# Patient Record
Sex: MALE | Race: WHITE
[De-identification: names, ages, dates, MRNs, and addresses within clinical notes are randomized per-mention and may not be internally consistent; named-entity substitution may affect disease eponyms.]

---

## 2018-01-09 ENCOUNTER — HOSPITAL ENCOUNTER (EMERGENCY)
Dept: HOSPITAL 72 - EMR | Age: 28
Discharge: HOME | End: 2018-01-09
Payer: COMMERCIAL

## 2018-01-09 VITALS — HEIGHT: 72 IN | BODY MASS INDEX: 23.03 KG/M2 | WEIGHT: 170 LBS

## 2018-01-09 VITALS — DIASTOLIC BLOOD PRESSURE: 74 MMHG | SYSTOLIC BLOOD PRESSURE: 125 MMHG

## 2018-01-09 DIAGNOSIS — J11.1: Primary | ICD-10-CM

## 2018-01-09 PROCEDURE — 99284 EMERGENCY DEPT VISIT MOD MDM: CPT

## 2018-01-09 PROCEDURE — 86710 INFLUENZA VIRUS ANTIBODY: CPT

## 2018-01-09 PROCEDURE — 96372 THER/PROPH/DIAG INJ SC/IM: CPT

## 2018-01-09 NOTE — EMERGENCY ROOM REPORT
History of Present Illness


General


Chief Complaint:  Headache


Source:  Patient





Present Illness


[facility]


The patient is a 27-year-old male who denies medical history presenting for 3 

days of nausea, vomiting, headache, fever, chills, myalgia, cough, and fatigue.

  He denies any known sick contacts or recent travel.  Pain is a 9/10 sharp 

sensation primarily to his head.  No known provoking relieving factors.


He denies other symptoms including hematemesis, diarrhea, neck pain or stiffness

, rash


Allergies:  


Coded Allergies:  


     No Known Allergies (Unverified , 1/9/18)





Patient History


Past Medical History:  see triage record


Pertinent Family History:  none


Reviewed Nursing Documentation:  PMH: Agreed, PSxH: Agreed





Nursing Documentation-PMH


Past Medical History:  No Stated History





Review of Systems


All Other Systems:  negative except mentioned in HPI





Physical Exam





Vital Signs








  Date Time  Temp Pulse Resp B/P (MAP) Pulse Ox O2 Delivery O2 Flow Rate FiO2


 


1/9/18 11:39 101.1 116 21 122/62 95 Room Air  








Sp02 EP Interpretation:  reviewed, normal


General Appearance:  no apparent distress, alert, GCS 15, non-toxic, lethargic


Head:  normocephalic, atraumatic


Eyes:  bilateral eye normal inspection, bilateral eye PERRL


ENT:  no angioedema, uvula midline, pharyngeal erythema


Neck:  full range of motion, supple/symm/no masses


Respiratory:  chest non-tender, lungs clear, normal breath sounds, no wheezing, 

speaking full sentences


Cardiovascular #1:  regular rate, rhythm, no edema


Musculoskeletal:  back normal, gait/station normal, normal range of motion, non-

tender


Neurologic:  alert, oriented x3, responsive, motor strength/tone normal, 

sensory intact, speech normal


Psychiatric:  judgement/insight normal, memory normal, mood/affect normal, no 

suicidal/homicidal ideation


Skin:  normal color, no rash, warm/dry, well hydrated


Lymphatic:  no adenopathy





Medical Decision Making


PA Attestation


Dr. Lara is my supervising physician. Patient management was discussed with 

my supervising physician


Diagnostic Impression:  


 Primary Impression:  


 Influenza


ER Course


The patient is a 27-year-old male who denies medical history presenting for 3 

days of nausea, vomiting, headache, fever, chills, myalgia, cough, and fatigue





Differential diagnosis include but not limited to influenza, pharyngitis, 

sinusitis, AOM, bronchitis, PNA





Physical exam: Patient is febrile.  Lethargic


HEENT exam reveals pharyngeal erythema.  No exudate.  Nasal congestion


Lungs are clear to auscultation bilaterally.  No respiratory distress


Skin warm and dry





The patient is given Toradol, reglan, and benadryl and is feeling better. 





The patient will be treated with motrin, zofran, cough medication.  he is given 

strict ER precautions.





Microbiology








 Date/Time


Source Procedure


Growth Status


 


 


 1/9/18 12:36


Nasal Nares Influenza Types A,B Antigen (POPPY) - Final Complete








Lab Results Impression


negative





Last Vital Signs








  Date Time  Temp Pulse Resp B/P (MAP) Pulse Ox O2 Delivery O2 Flow Rate FiO2


 


1/9/18 11:39 101.1 116 21 122/62 95 Room Air  








Status:  improved


Disposition:  HOME, SELF-CARE


Condition:  Improved


Scripts


Codeine/Promethazine Hcl* (PROMETHAZINE-CODEINE SYRUP*) 118 Ml Syrup


5 ML ORAL Q6H Y for For Cough, #118 ML 0 Refills


   Prov: ANTHONY KIMBLE P.A.         1/9/18 


Ondansetron* (ZOFRAN*) 4 Mg Tablet


4 MG ORAL Q6H Y for Nausea & Vomiting, #15 TAB


   Prov: TERZIANANTHONY P.A.         1/9/18 


Ibuprofen* (MOTRIN*) 600 Mg Tablet


600 MG ORAL Q8H Y for For Pain, #30 TAB 0 Refills


   Prov: TERZIANANTHONY P.A.         1/9/18


Referrals:  


NOT CHOSEN IPA/MD,REFERRING (PCP)











ANTHONY KIMBLE Jan 9, 2018 12:34

## 2023-10-20 ENCOUNTER — HOSPITAL ENCOUNTER (INPATIENT)
Facility: CLINIC | Age: 33
LOS: 6 days | Discharge: PSYCHIATRIC OR SUBSTANCE USE FACILITY | DRG: 897 | End: 2023-10-27
Attending: FAMILY MEDICINE | Admitting: PSYCHIATRY & NEUROLOGY

## 2023-10-20 DIAGNOSIS — F13.20 BENZODIAZEPINE DEPENDENCE (H): ICD-10-CM

## 2023-10-20 DIAGNOSIS — F15.10 METHAMPHETAMINE ABUSE (H): ICD-10-CM

## 2023-10-20 DIAGNOSIS — F10.10 ALCOHOL ABUSE: ICD-10-CM

## 2023-10-20 DIAGNOSIS — F11.229 OPIOID DEPENDENCE WITH INTOXICATION WITH COMPLICATION (H): ICD-10-CM

## 2023-10-20 LAB
ALBUMIN SERPL BCG-MCNC: 4.5 G/DL (ref 3.5–5.2)
ALP SERPL-CCNC: 72 U/L (ref 40–129)
ALT SERPL W P-5'-P-CCNC: 38 U/L (ref 0–70)
ANION GAP SERPL CALCULATED.3IONS-SCNC: 9 MMOL/L (ref 7–15)
AST SERPL W P-5'-P-CCNC: 42 U/L (ref 0–45)
BASO+EOS+MONOS # BLD AUTO: ABNORMAL 10*3/UL
BASO+EOS+MONOS NFR BLD AUTO: ABNORMAL %
BASOPHILS # BLD AUTO: 0 10E3/UL (ref 0–0.2)
BASOPHILS NFR BLD AUTO: 1 %
BILIRUB SERPL-MCNC: 0.2 MG/DL
BUN SERPL-MCNC: 22.4 MG/DL (ref 6–20)
CALCIUM SERPL-MCNC: 8.9 MG/DL (ref 8.6–10)
CHLORIDE SERPL-SCNC: 100 MMOL/L (ref 98–107)
CREAT SERPL-MCNC: 0.89 MG/DL (ref 0.67–1.17)
DEPRECATED HCO3 PLAS-SCNC: 27 MMOL/L (ref 22–29)
EGFRCR SERPLBLD CKD-EPI 2021: >90 ML/MIN/1.73M2
EOSINOPHIL # BLD AUTO: 0.2 10E3/UL (ref 0–0.7)
EOSINOPHIL NFR BLD AUTO: 3 %
ERYTHROCYTE [DISTWIDTH] IN BLOOD BY AUTOMATED COUNT: 11.9 % (ref 10–15)
ETHANOL SERPL-MCNC: <0.01 G/DL
GLUCOSE SERPL-MCNC: 124 MG/DL (ref 70–99)
HCT VFR BLD AUTO: 37.1 % (ref 40–53)
HGB BLD-MCNC: 12.4 G/DL (ref 13.3–17.7)
IMM GRANULOCYTES # BLD: 0 10E3/UL
IMM GRANULOCYTES NFR BLD: 0 %
LYMPHOCYTES # BLD AUTO: 2.9 10E3/UL (ref 0.8–5.3)
LYMPHOCYTES NFR BLD AUTO: 55 %
MCH RBC QN AUTO: 32 PG (ref 26.5–33)
MCHC RBC AUTO-ENTMCNC: 33.4 G/DL (ref 31.5–36.5)
MCV RBC AUTO: 96 FL (ref 78–100)
MONOCYTES # BLD AUTO: 0.6 10E3/UL (ref 0–1.3)
MONOCYTES NFR BLD AUTO: 12 %
NEUTROPHILS # BLD AUTO: 1.5 10E3/UL (ref 1.6–8.3)
NEUTROPHILS NFR BLD AUTO: 29 %
NRBC # BLD AUTO: 0 10E3/UL
NRBC BLD AUTO-RTO: 0 /100
PLATELET # BLD AUTO: 230 10E3/UL (ref 150–450)
POTASSIUM SERPL-SCNC: 4 MMOL/L (ref 3.4–5.3)
PROT SERPL-MCNC: 6.4 G/DL (ref 6.4–8.3)
RBC # BLD AUTO: 3.87 10E6/UL (ref 4.4–5.9)
SODIUM SERPL-SCNC: 136 MMOL/L (ref 135–145)
WBC # BLD AUTO: 5.3 10E3/UL (ref 4–11)

## 2023-10-20 PROCEDURE — 99284 EMERGENCY DEPT VISIT MOD MDM: CPT | Performed by: FAMILY MEDICINE

## 2023-10-20 PROCEDURE — 82077 ASSAY SPEC XCP UR&BREATH IA: CPT | Performed by: FAMILY MEDICINE

## 2023-10-20 PROCEDURE — 80053 COMPREHEN METABOLIC PANEL: CPT | Performed by: FAMILY MEDICINE

## 2023-10-20 PROCEDURE — 85025 COMPLETE CBC W/AUTO DIFF WBC: CPT | Performed by: FAMILY MEDICINE

## 2023-10-20 PROCEDURE — 36415 COLL VENOUS BLD VENIPUNCTURE: CPT | Performed by: FAMILY MEDICINE

## 2023-10-20 PROCEDURE — 99285 EMERGENCY DEPT VISIT HI MDM: CPT | Mod: 25 | Performed by: FAMILY MEDICINE

## 2023-10-20 PROCEDURE — 82977 ASSAY OF GGT: CPT | Performed by: PSYCHIATRY & NEUROLOGY

## 2023-10-20 PROCEDURE — 83036 HEMOGLOBIN GLYCOSYLATED A1C: CPT | Performed by: PSYCHIATRY & NEUROLOGY

## 2023-10-20 PROCEDURE — HZ2ZZZZ DETOXIFICATION SERVICES FOR SUBSTANCE ABUSE TREATMENT: ICD-10-PCS | Performed by: CLINICAL NURSE SPECIALIST

## 2023-10-20 PROCEDURE — 84443 ASSAY THYROID STIM HORMONE: CPT | Performed by: PSYCHIATRY & NEUROLOGY

## 2023-10-20 ASSESSMENT — ACTIVITIES OF DAILY LIVING (ADL): ADLS_ACUITY_SCORE: 35

## 2023-10-21 ENCOUNTER — TELEPHONE (OUTPATIENT)
Dept: BEHAVIORAL HEALTH | Facility: CLINIC | Age: 33
End: 2023-10-21

## 2023-10-21 PROBLEM — F13.20 BENZODIAZEPINE DEPENDENCE (H): Status: ACTIVE | Noted: 2023-10-21

## 2023-10-21 LAB
AMPHETAMINES UR QL SCN: ABNORMAL
BARBITURATES UR QL SCN: ABNORMAL
BENZODIAZ UR QL SCN: ABNORMAL
BZE UR QL SCN: ABNORMAL
CANNABINOIDS UR QL SCN: ABNORMAL
FENTANYL UR QL: ABNORMAL
OPIATES UR QL SCN: ABNORMAL
PCP QUAL URINE (ROCHE): ABNORMAL

## 2023-10-21 PROCEDURE — 99223 1ST HOSP IP/OBS HIGH 75: CPT | Mod: AI | Performed by: CLINICAL NURSE SPECIALIST

## 2023-10-21 PROCEDURE — 128N000004 HC R&B CD ADULT

## 2023-10-21 PROCEDURE — 250N000013 HC RX MED GY IP 250 OP 250 PS 637: Performed by: CLINICAL NURSE SPECIALIST

## 2023-10-21 PROCEDURE — 80307 DRUG TEST PRSMV CHEM ANLYZR: CPT | Performed by: FAMILY MEDICINE

## 2023-10-21 RX ORDER — BUPRENORPHINE AND NALOXONE 8; 2 MG/1; MG/1
1 FILM, SOLUBLE BUCCAL; SUBLINGUAL DAILY
Status: ON HOLD | COMMUNITY
End: 2023-10-27

## 2023-10-21 RX ORDER — ACETAMINOPHEN 325 MG/1
650 TABLET ORAL EVERY 4 HOURS PRN
Status: DISCONTINUED | OUTPATIENT
Start: 2023-10-21 | End: 2023-10-27 | Stop reason: HOSPADM

## 2023-10-21 RX ORDER — LOPERAMIDE HCL 2 MG
2 CAPSULE ORAL 4 TIMES DAILY PRN
Status: DISCONTINUED | OUTPATIENT
Start: 2023-10-21 | End: 2023-10-27 | Stop reason: HOSPADM

## 2023-10-21 RX ORDER — MAGNESIUM HYDROXIDE/ALUMINUM HYDROXICE/SIMETHICONE 120; 1200; 1200 MG/30ML; MG/30ML; MG/30ML
30 SUSPENSION ORAL EVERY 4 HOURS PRN
Status: DISCONTINUED | OUTPATIENT
Start: 2023-10-21 | End: 2023-10-27 | Stop reason: HOSPADM

## 2023-10-21 RX ORDER — AMOXICILLIN 250 MG
1 CAPSULE ORAL 2 TIMES DAILY PRN
Status: DISCONTINUED | OUTPATIENT
Start: 2023-10-21 | End: 2023-10-27 | Stop reason: HOSPADM

## 2023-10-21 RX ORDER — HYDROXYZINE HYDROCHLORIDE 25 MG/1
25 TABLET, FILM COATED ORAL EVERY 4 HOURS PRN
Status: DISCONTINUED | OUTPATIENT
Start: 2023-10-21 | End: 2023-10-27 | Stop reason: HOSPADM

## 2023-10-21 RX ORDER — CLONIDINE HYDROCHLORIDE 0.1 MG/1
0.1 TABLET ORAL 4 TIMES DAILY PRN
Status: DISCONTINUED | OUTPATIENT
Start: 2023-10-21 | End: 2023-10-27 | Stop reason: HOSPADM

## 2023-10-21 RX ORDER — NICOTINE 21 MG/24HR
1 PATCH, TRANSDERMAL 24 HOURS TRANSDERMAL DAILY
Status: DISCONTINUED | OUTPATIENT
Start: 2023-10-21 | End: 2023-10-27 | Stop reason: HOSPADM

## 2023-10-21 RX ORDER — BUPRENORPHINE HYDROCHLORIDE AND NALOXONE HYDROCHLORIDE DIHYDRATE 8; 2 MG/1; MG/1
1 TABLET SUBLINGUAL DAILY PRN
Status: DISCONTINUED | OUTPATIENT
Start: 2023-10-21 | End: 2023-10-27 | Stop reason: HOSPADM

## 2023-10-21 RX ORDER — ONDANSETRON 4 MG/1
4 TABLET, ORALLY DISINTEGRATING ORAL EVERY 6 HOURS PRN
Status: DISCONTINUED | OUTPATIENT
Start: 2023-10-21 | End: 2023-10-27 | Stop reason: HOSPADM

## 2023-10-21 RX ORDER — PHENOBARBITAL 32.4 MG/1
32.4 TABLET ORAL 3 TIMES DAILY
Status: DISCONTINUED | OUTPATIENT
Start: 2023-10-21 | End: 2023-10-24

## 2023-10-21 RX ORDER — TRAZODONE HYDROCHLORIDE 50 MG/1
50 TABLET, FILM COATED ORAL
Status: DISCONTINUED | OUTPATIENT
Start: 2023-10-21 | End: 2023-10-27 | Stop reason: HOSPADM

## 2023-10-21 RX ORDER — FLUOXETINE 40 MG/1
40 CAPSULE ORAL DAILY
Status: ON HOLD | COMMUNITY
End: 2023-10-27

## 2023-10-21 RX ADMIN — PHENOBARBITAL 32.4 MG: 32.4 TABLET ORAL at 12:37

## 2023-10-21 RX ADMIN — BUPRENORPHINE AND NALOXONE 1 TABLET: 8; 2 TABLET SUBLINGUAL at 22:07

## 2023-10-21 RX ADMIN — TRAZODONE HYDROCHLORIDE 50 MG: 50 TABLET ORAL at 23:22

## 2023-10-21 RX ADMIN — NICOTINE POLACRILEX 4 MG: 4 GUM, CHEWING BUCCAL at 12:35

## 2023-10-21 RX ADMIN — NICOTINE 1 PATCH: 14 PATCH, EXTENDED RELEASE TRANSDERMAL at 12:33

## 2023-10-21 RX ADMIN — PHENOBARBITAL 32.4 MG: 32.4 TABLET ORAL at 19:18

## 2023-10-21 RX ADMIN — ACETAMINOPHEN 650 MG: 325 TABLET, FILM COATED ORAL at 20:15

## 2023-10-21 RX ADMIN — FLUOXETINE 40 MG: 20 CAPSULE ORAL at 12:37

## 2023-10-21 RX ADMIN — HYDROXYZINE HYDROCHLORIDE 25 MG: 25 TABLET, FILM COATED ORAL at 22:59

## 2023-10-21 RX ADMIN — HYDROXYZINE HYDROCHLORIDE 25 MG: 25 TABLET, FILM COATED ORAL at 19:18

## 2023-10-21 ASSESSMENT — ACTIVITIES OF DAILY LIVING (ADL)
ADLS_ACUITY_SCORE: 35
HYGIENE/GROOMING: INDEPENDENT
ADLS_ACUITY_SCORE: 35
ADLS_ACUITY_SCORE: 35
ADLS_ACUITY_SCORE: 28
ADLS_ACUITY_SCORE: 35
DRESS: STREET CLOTHES
ADLS_ACUITY_SCORE: 28
HYGIENE/GROOMING: INDEPENDENT
ADLS_ACUITY_SCORE: 28
ORAL_HYGIENE: INDEPENDENT
ADLS_ACUITY_SCORE: 35
ADLS_ACUITY_SCORE: 28
ADLS_ACUITY_SCORE: 45
ORAL_HYGIENE: INDEPENDENT
DRESS: INDEPENDENT

## 2023-10-21 NOTE — ED NOTES
Patient reports multi drug use:  Benzos 4mg daily for about 3 months  Meth: daily, 0.5g for about 6 months  Fentanyl: 0.5 daily for about 3 months    Denies alcohol/denies SI.

## 2023-10-21 NOTE — MEDICATION SCRIBE - ADMISSION MEDICATION HISTORY
Medication Scribe Admission Medication History    Admission medication history is complete. The information provided in this note is only as accurate as the sources available at the time of the update.    Information Source(s): Patient and CareEverywhere/SureScripts via in-person  Patient reports he recently moved from [place], [place].    Pertinent Information: None.    Changes made to PTA medication list:  Added: Buprenorphine HCL- Naloxone HCL 8-2 mg / Film, Fluoxetine 40 mg.  Deleted: None  Changed: None    Medication Affordability:  Not including over the counter (OTC) medications, was there a time in the past 3 months when you did not take your medications as prescribed because of cost?: Yes    Allergies reviewed with patient and updates made in EHR: yes    Medication History Completed By: Claude Whittington MD 10/21/2023 8:52 AM    PTA Med List   Medication Sig Last Dose    buprenorphine HCl-naloxone HCl (SUBOXONE) 8-2 MG per film Place 1 Film under the tongue daily Past Week at unknown    FLUoxetine (PROZAC) 40 MG capsule Take 40 mg by mouth daily Past Week at unknown

## 2023-10-21 NOTE — TELEPHONE ENCOUNTER
00:40 - Left message for Array provider. Awaiting CB  01:44 - Dr. Figueroa accepts for detox. Placed pt in queue for 3A    R: SHIRA / Ilir / TONIE    01:48 - Notified unit RN  01:49 - Notified ED and requested a UDS be collected prior to transfer     Indicia completed

## 2023-10-21 NOTE — PROGRESS NOTES
10/21/23 1211   Patient Belongings   Did you bring any home meds/supplements to the hospital?  No   Patient Belongings other (see comments)   Belongings Search Yes   Clothing Search Yes   Second Staff Fred Campbell     Storage: back pack, shirts, hat light jacket.   Shoes, hat, 2 small bags, water bottle in storage  Wallet, 2 cell in med room  Mirror ,book light,ear buds, 2 lighters , 3 tools in sharps  3 goldtone necklaces, passport, $11.00 cash, Visa, 2 MC, [place] DL in security  A               Admission:  I am responsible for any personal items that are not sent to the safe or pharmacy.  [facility] is not responsible for loss, theft or damage of any property in my possession.    Signature:  _________________________________ Date: _______  Time: _____                                              Staff Signature:  ____________________________ Date: ________  Time: _____      2nd Staff person, if patient is unable/unwilling to sign:    Signature: ________________________________ Date: ________  Time: _____     Discharge:  [facility] has returned all of my personal belongings:    Signature: _________________________________ Date: ________  Time: _____                                          Staff Signature:  ____________________________ Date: ________  Time: _____
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
## 2023-10-21 NOTE — ED PROVIDER NOTES
ED Provider Note  [facility]      History     Chief Complaint   Patient presents with    Addiction Problem     Meth, fentanyl, benzo and xanax.  Patient states he needs help getting off these drugs.  Patient states meth is the one he uses daily.      HPI  Max H Bosworth is a 33 year old male who presents seeking detox from multiple substances.  Patient states he has a longstanding addiction to opiates, primarily fentanyl.  Has been on and off Suboxone.  For the last 2 months he has been using fentanyl multiple times a week and last used approximately 2 hours ago.  He generally experiences opiate withdrawal within 24 hours of stopping.  He also has been using benzodiazepines on a daily basis for over 2 months, he estimates 4 to 6 mg of Xanax daily.  His last use was approximately 18 hours ago.  He uses methamphetamine several times a week, last used 24 hours ago.  He drinks alcohol approximately 6 beers per day for the last 2 months last drink earlier this morning.  He states he has never had alcohol withdrawal seizures, benzo with drawl seizures, or DTs but experiences sweats, shakes, nausea, restlessness and has been unable to stop using any of these substances in the community without assistance.  He is hoping to get detoxed off all substances and enter chemical dependency treatment.  He denies other medical or psychiatric complaints.    Past Medical History  No past medical history on file.  No past surgical history on file.  No current outpatient medications on file.    Allergies   Allergen Reactions    Penicillins Hives    Wellbutrin [Bupropion] Hives     Family History  No family history on file.  Social History          A medically appropriate review of systems was performed with pertinent positives and negatives noted in the HPI, and all other systems negative.    Physical Exam   BP: 109/66  Pulse: 58  Temp: 97.9  F (36.6  C)  Resp: 16  Weight: 74.8 kg (165 lb)  SpO2: 97  %  Physical Exam  Vitals and nursing note reviewed.   Constitutional:       General: He is not in acute distress.     Appearance: Normal appearance. He is not toxic-appearing.   HENT:      Head: Atraumatic.   Eyes:      General: No scleral icterus.     Conjunctiva/sclera: Conjunctivae normal.   Cardiovascular:      Rate and Rhythm: Normal rate.      Heart sounds: Normal heart sounds.   Pulmonary:      Effort: Pulmonary effort is normal. No respiratory distress.      Breath sounds: Normal breath sounds.   Abdominal:      Palpations: Abdomen is soft.      Tenderness: There is no abdominal tenderness.   Musculoskeletal:         General: No deformity.      Cervical back: Neck supple.   Skin:     General: Skin is warm.   Neurological:      Mental Status: He is alert.   Psychiatric:         Attention and Perception: Attention normal.         Mood and Affect: Mood normal.         Speech: Speech normal.         Behavior: Behavior normal.           ED Course, Procedures, & Data      Procedures                     Results for orders placed or performed during the hospital encounter of 10/20/23   Comprehensive metabolic panel     Status: Abnormal   Result Value Ref Range    Sodium 136 135 - 145 mmol/L    Potassium 4.0 3.4 - 5.3 mmol/L    Carbon Dioxide (CO2) 27 22 - 29 mmol/L    Anion Gap 9 7 - 15 mmol/L    Urea Nitrogen 22.4 (H) 6.0 - 20.0 mg/dL    Creatinine 0.89 0.67 - 1.17 mg/dL    GFR Estimate >90 >60 mL/min/1.73m2    Calcium 8.9 8.6 - 10.0 mg/dL    Chloride 100 98 - 107 mmol/L    Glucose 124 (H) 70 - 99 mg/dL    Alkaline Phosphatase 72 40 - 129 U/L    AST 42 0 - 45 U/L    ALT 38 0 - 70 U/L    Protein Total 6.4 6.4 - 8.3 g/dL    Albumin 4.5 3.5 - 5.2 g/dL    Bilirubin Total 0.2 <=1.2 mg/dL   Ethyl Alcohol Level     Status: Normal   Result Value Ref Range    Alcohol ethyl <0.01 <=0.01 g/dL   CBC with platelets and differential     Status: Abnormal   Result Value Ref Range    WBC Count 5.3 4.0 - 11.0 10e3/uL    RBC Count  3.87 (L) 4.40 - 5.90 10e6/uL    Hemoglobin 12.4 (L) 13.3 - 17.7 g/dL    Hematocrit 37.1 (L) 40.0 - 53.0 %    MCV 96 78 - 100 fL    MCH 32.0 26.5 - 33.0 pg    MCHC 33.4 31.5 - 36.5 g/dL    RDW 11.9 10.0 - 15.0 %    Platelet Count 230 150 - 450 10e3/uL    % Neutrophils 29 %    % Lymphocytes 55 %    % Monocytes 12 %    Mids % (Monos, Eos, Basos)      % Eosinophils 3 %    % Basophils 1 %    % Immature Granulocytes 0 %    NRBCs per 100 WBC 0 <1 /100    Absolute Neutrophils 1.5 (L) 1.6 - 8.3 10e3/uL    Absolute Lymphocytes 2.9 0.8 - 5.3 10e3/uL    Absolute Monocytes 0.6 0.0 - 1.3 10e3/uL    Mids Abs (Monos, Eos, Basos)      Absolute Eosinophils 0.2 0.0 - 0.7 10e3/uL    Absolute Basophils 0.0 0.0 - 0.2 10e3/uL    Absolute Immature Granulocytes 0.0 <=0.4 10e3/uL    Absolute NRBCs 0.0 10e3/uL   CBC with platelets differential     Status: Abnormal    Narrative    The following orders were created for panel order CBC with platelets differential.  Procedure                               Abnormality         Status                     ---------                               -----------         ------                     CBC with platelets and d...[[id number]]  Abnormal            Final result                 Please view results for these tests on the individual orders.     Medications - No data to display  Labs Ordered and Resulted from Time of ED Arrival to Time of ED Departure   COMPREHENSIVE METABOLIC PANEL - Abnormal       Result Value    Sodium 136      Potassium 4.0      Carbon Dioxide (CO2) 27      Anion Gap 9      Urea Nitrogen 22.4 (*)     Creatinine 0.89      GFR Estimate >90      Calcium 8.9      Chloride 100      Glucose 124 (*)     Alkaline Phosphatase 72      AST 42      ALT 38      Protein Total 6.4      Albumin 4.5      Bilirubin Total 0.2     CBC WITH PLATELETS AND DIFFERENTIAL - Abnormal    WBC Count 5.3      RBC Count 3.87 (*)     Hemoglobin 12.4 (*)     Hematocrit 37.1 (*)     MCV 96      MCH 32.0      MCHC  33.4      RDW 11.9      Platelet Count 230      % Neutrophils 29      % Lymphocytes 55      % Monocytes 12      Mids % (Monos, Eos, Basos)        % Eosinophils 3      % Basophils 1      % Immature Granulocytes 0      NRBCs per 100 WBC 0      Absolute Neutrophils 1.5 (*)     Absolute Lymphocytes 2.9      Absolute Monocytes 0.6      Mids Abs (Monos, Eos, Basos)        Absolute Eosinophils 0.2      Absolute Basophils 0.0      Absolute Immature Granulocytes 0.0      Absolute NRBCs 0.0     ETHYL ALCOHOL LEVEL - Normal    Alcohol ethyl <0.01       No orders to display          Critical care was not performed.     Medical Decision Making  The patient's presentation was of high complexity (a chronic illness severe exacerbation, progression, or side effect of treatment).    The patient's evaluation involved:  an assessment requiring an independent historian (significant other provides collateral information)  ordering and/or review of 3+ test(s) in this encounter (see separate area of note for details)    The patient's management necessitated moderate risk (prescription drug management including medications given in the ED) and high risk (a decision regarding hospitalization).    Assessment & Plan    A 33-year-old male with a history of polysubstance abuse who is known to be dependent on benzodiazepines, opiates and possibly alcohol and also uses methamphetamine.  Has been using all 4 substances for the last 2 months and all within the last 24 hours.  Pertinent to our detox facility, uses up to 6 mg of Xanax per day and has experienced benzodiazepine withdrawal in the past though he does not have a history of withdrawal seizures or DTs.  Currently his alcohol level is 0 and he appears intoxicated possibly with opiates.  Labs were obtained and they show no contraindication to detox admission.  He is an appropriate candidate for voluntary detox admission as he may undergo complicated withdrawal from benzodiazepines and/or  alcohol.  He will consent to admission.    I have reviewed the nursing notes. I have reviewed the findings, diagnosis, plan and need for follow up with the patient.    New Prescriptions    No medications on file       Final diagnoses:   Benzodiazepine dependence (H)   Opioid dependence with intoxication with complication (H)   Alcohol abuse   Methamphetamine abuse (H)       Krishna Jacobs MD  [facility] EMERGENCY DEPARTMENT  10/20/2023     Krishna Jacobs MD  10/21/23 0026

## 2023-10-21 NOTE — PLAN OF CARE
Problem: Substance Withdrawal  Intervention: Substance Withdrawal  Recent Flowsheet Documentation  Taken 10/21/2023 1210 by Isak Ramírez RN  Substance Withdrawal Interventions:   interventions implemented as appropriate   monitor substance withdrawal process   encourage nutrition and hydration  Alcohol Withdrawl Interventions:   monitor need for prn medication   provide emotional support   Goal Outcome Evaluation:    Plan of Care Reviewed With: patient        SBAR Max H Bosworth is a 33 year old year old male with a chief complaint of Addiction Problem (Meth, fentanyl, benzo and xanax.  Patient states he needs help getting off these drugs.  Patient states meth is the one he uses daily. )    S = Situation:   Admit  B  = Background:   Pt admitted for benzodiazepine withdrawal.  Pt reports drinking occasionally, but he uses fentanyl 0.5 mg daily and methamphetamines daily.  Pt reports that his use of xanax is approximately 4-6 mg a day but he has no idea of its real strength or it's authenticity.  Pt says that the xanax isn't hospital grade or from a prescription--it is from another source.  At this time, pt is having some withdrawal but his withdrawal is from multiple drugs and the symptoms are muddied in regard to specific drug withdrawal.  Pt arrived in [place] from [place] yesterday morning on the recommendation of a friend.  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  He is familiar with the detox process.  He has been through treatment X 5.  Discussed out of state insurance coverage for treatment and detox.    A  =  Assessment:   Vital Signs: /77   Pulse 65   Temp 97.8  F (36.6  C) (Oral)   Resp 16   Ht 1.829 m (6')   Wt 74.8 kg (165 lb)   SpO2 97%   BMI 22.38 kg/m    Alert and oriented X 3, no SI, no SIB.  Pt reports chronic depression and anxiety.  He says that he medicates his anxiety with alcohol.  He has been having a difficulty time achieving any length of  sobriety while living in [place].  Pt has a diaphoretic glow, a low grade tremor, he is anxious and endorses depression.  Pt is cooperative and calm.    R =   Request or Recommendation:   Monitor multiple withdrawal syndromes, Dr. Roque to evaluate, case management to see, medicine to see

## 2023-10-21 NOTE — PROGRESS NOTES
Case Management:     Writer checked-in and introduced role to pt's care and how to assist pt during their stay. Inquired with pt about what they are needing during their stay and what they are considering for their aftercare plans. Pt processed that he is from and lives in [place], [place]. Pt discussed that he came out to [place] to try treatment out here noting that he has done various treatments in [place] feeing that they weren't helpful. Pt processed that his brother's are working on/solidifying his insurance. Pt shared that he has been using Fentanyl, Meth and Benzos. Discussed with pt about completing his assessment paper work noting that he will need that to help with referrals to treatment. Pt was agreeable to complete the assessment paper work      Referral Status:  None at this time but pt is seeking inpatient treatment:  Considerations: [facility], [facility], [facility]    Contacts:  None at this time    Next Steps and Discharge Plan or Goal:  Pt needs an AMANDO asssessment and a CM/ will complete one once pt turns in his paper work. CM to check in with pt tomorrow.     Lelo Coley, LADC, LPCC

## 2023-10-21 NOTE — PLAN OF CARE
Problem: Substance Withdrawal  Goal: Substance Withdrawal  Description: Signs and symptoms of listed problems will be absent or manageable.  Outcome: Progressing   Goal Outcome Evaluation:    Patient is 33 year old male who was admitted on the day shift  for benzodiazepine withdrawal.     On this shift patient was alert and oriented. Vitals were stable MSSA score  was 3 and 5, COWS were 4 and 2.  Affect bright , mood was calmed and cooperative. Patient  denied SI/SIB or feeling depressed, verbalized feeling a little anxious, but should get better soon, patient was given PRN hydroxyzine at this time.    Patient complained of back pain rated at 7/10 was given PRN Tylenol. Patient spent most of the shift sitting in the lounge watching TV with peers. Took all scheduled medications with zero problem. Appetite was good ate 100% dinner and snacks. No behavior issues or any safety concern observed on this shift.    Around 2200 patient complained of feeling anxious and sweating re assessed patient at this time,  MSSA was 10, COWS 11. Patient was given Suboxone per COWS protocol. After 30 minutes patient came out again complaining of feeling anxious and restless. Paged [facility] awaiting for call back. Patient was given PRN hydroxyzine at 2300, patient just took a shower at 2300. Will continue to monitor. PRN trazodone given at 2325.

## 2023-10-21 NOTE — TELEPHONE ENCOUNTER
S: [facility] , Provider Reynaldo calling at 12:23AM with clinical on a 33 year old/Male presenting for alcohol/benzo detox.     B: Pt presents for ETOH detox.   Currently reports drinking 6 beers a day and using 4-6 mg of xanax a day.  Pt reports daily use of xanax for the past 2 months   Patient reports last use was this morning for alcohol and 18 hours ago for xanax.  Pt ADELINA: 0.0  Pt  denies hx of DT  Pt  denies hx of seizures. Last seizure: N/A  Pt endorsing the following symptoms of withdrawal: Shaky  MSSA Score: None    Pt denies acute mental health or medical concerns.   Pt endorses other drug use: Amphetamines Amount/frequency:  Pt reports using meth yesterday and fentanyl before coming into the ED.    Does Pt have a detox care plan in [facility]? No  Does pt present with specific needs, assistive devices, or exclusionary criteria? None  Is the patient ambulating, eating and drinking in the ED? Yes    A: Pt meets criteria to be presented for IP detox admission. Patient is voluntary    COVID Symptoms: No  If yes, COVID test required   Utox: Not ordered, intake to request lab   CMP: Abnormalities: Urea Nitrogen 22.4, Glucose 124  CBC: Abnormalities: RBC 3.87, Hemoglobin 12.4, Hematocrit 37.1, Absolute Neutrophils 1.5  HCG: N/A     R: Patient cleared and ready for behavioral bed placement: Yes    Pt is meeting criteria for presentation to 3A/CD    Does Patient need a Transfer Center request created? No, Pt is located within [facility] ED, [facility] ED, or [place] ED

## 2023-10-21 NOTE — ED NOTES
Pt had 4 pieces of tinfoil with drug paraphernalia in them that was found during security search. Drugs were taken from pt. and given to security.

## 2023-10-22 PROCEDURE — 128N000004 HC R&B CD ADULT

## 2023-10-22 PROCEDURE — 250N000013 HC RX MED GY IP 250 OP 250 PS 637: Performed by: CLINICAL NURSE SPECIALIST

## 2023-10-22 PROCEDURE — 250N000013 HC RX MED GY IP 250 OP 250 PS 637: Performed by: PSYCHIATRY & NEUROLOGY

## 2023-10-22 RX ORDER — BUPRENORPHINE AND NALOXONE 8; 2 MG/1; MG/1
1 FILM, SOLUBLE BUCCAL; SUBLINGUAL DAILY
Status: DISCONTINUED | OUTPATIENT
Start: 2023-10-22 | End: 2023-10-27 | Stop reason: HOSPADM

## 2023-10-22 RX ORDER — DICYCLOMINE HYDROCHLORIDE 10 MG/1
20 CAPSULE ORAL 4 TIMES DAILY PRN
Status: DISCONTINUED | OUTPATIENT
Start: 2023-10-22 | End: 2023-10-27 | Stop reason: HOSPADM

## 2023-10-22 RX ORDER — BUPRENORPHINE AND NALOXONE 8; 2 MG/1; MG/1
1 FILM, SOLUBLE BUCCAL; SUBLINGUAL ONCE
Status: COMPLETED | OUTPATIENT
Start: 2023-10-22 | End: 2023-10-22

## 2023-10-22 RX ORDER — METHOCARBAMOL 500 MG/1
500 TABLET, FILM COATED ORAL 4 TIMES DAILY PRN
Status: DISCONTINUED | OUTPATIENT
Start: 2023-10-22 | End: 2023-10-27 | Stop reason: HOSPADM

## 2023-10-22 RX ADMIN — HYDROXYZINE HYDROCHLORIDE 25 MG: 25 TABLET, FILM COATED ORAL at 22:13

## 2023-10-22 RX ADMIN — METHOCARBAMOL 500 MG: 500 TABLET ORAL at 08:07

## 2023-10-22 RX ADMIN — CLONIDINE HYDROCHLORIDE 0.1 MG: 0.1 TABLET ORAL at 00:20

## 2023-10-22 RX ADMIN — TRAZODONE HYDROCHLORIDE 50 MG: 50 TABLET ORAL at 22:13

## 2023-10-22 RX ADMIN — BUPRENORPHINE AND NALOXONE 1 FILM: 8; 2 FILM, SOLUBLE BUCCAL; SUBLINGUAL at 03:19

## 2023-10-22 RX ADMIN — HYDROXYZINE HYDROCHLORIDE 25 MG: 25 TABLET, FILM COATED ORAL at 08:07

## 2023-10-22 RX ADMIN — PHENOBARBITAL 32.4 MG: 32.4 TABLET ORAL at 13:06

## 2023-10-22 RX ADMIN — METHOCARBAMOL 500 MG: 500 TABLET ORAL at 22:13

## 2023-10-22 RX ADMIN — CLONIDINE HYDROCHLORIDE 0.1 MG: 0.1 TABLET ORAL at 16:35

## 2023-10-22 RX ADMIN — BUPRENORPHINE AND NALOXONE 1 TABLET: 8; 2 TABLET SUBLINGUAL at 16:35

## 2023-10-22 RX ADMIN — HYDROXYZINE HYDROCHLORIDE 25 MG: 25 TABLET, FILM COATED ORAL at 13:06

## 2023-10-22 RX ADMIN — PHENOBARBITAL 32.4 MG: 32.4 TABLET ORAL at 20:06

## 2023-10-22 RX ADMIN — FLUOXETINE 40 MG: 20 CAPSULE ORAL at 08:07

## 2023-10-22 RX ADMIN — METHOCARBAMOL 500 MG: 500 TABLET ORAL at 00:48

## 2023-10-22 RX ADMIN — PHENOBARBITAL 32.4 MG: 32.4 TABLET ORAL at 08:07

## 2023-10-22 RX ADMIN — BUPRENORPHINE AND NALOXONE 1 FILM: 8; 2 FILM, SOLUBLE BUCCAL; SUBLINGUAL at 08:49

## 2023-10-22 ASSESSMENT — ACTIVITIES OF DAILY LIVING (ADL)
ORAL_HYGIENE: INDEPENDENT
ADLS_ACUITY_SCORE: 28
DRESS: INDEPENDENT
ADLS_ACUITY_SCORE: 28
ADLS_ACUITY_SCORE: 28
ORAL_HYGIENE: INDEPENDENT
ADLS_ACUITY_SCORE: 28
HYGIENE/GROOMING: INDEPENDENT
ADLS_ACUITY_SCORE: 28
ADLS_ACUITY_SCORE: 28
HYGIENE/GROOMING: INDEPENDENT
DRESS: INDEPENDENT
ADLS_ACUITY_SCORE: 28

## 2023-10-22 NOTE — PLAN OF CARE
Pt continues to experience withdrawal symptoms - MSSA 10 and COWS 10. Pt given acetaminohen 650 mg @ 2015, Suboxone 8-2 Mg @ 2207, hydroxyzine 25 mg @ 2259, and clonidine 0.1 mg @ 0020, and Robaxin 500 mg @ 0048. COWS 14 - Provider notified. PRN dose of 8-2 mg of buprenorphine ordered. Spoke with pharmacy to verify - ok to give - pharmacy ok'd additional dose. Continue to monitor.

## 2023-10-22 NOTE — PROVIDER NOTIFICATION
Pt continues to experience withdrawal symptoms despite interventions- MSSA 10 and COWS 10; Pt given acetaminohen 650 mg @ 2015, Suboxone 8-2 Mg @ 2207, hydroxyzine 25 mg @ 2259, and clonidine 0.1 mg @ 0020.  Provider notified.

## 2023-10-22 NOTE — PLAN OF CARE
Problem: Substance Withdrawal  Goal: Substance Withdrawal  Description: Signs and symptoms of listed problems will be absent or manageable.  Outcome: Progressing  Flowsheets (Taken 10/21/2023 0053)  Substance Withdrawal Assessed: all    Behavioral  Pt appeared restless overnight; no behavioral concerns noted;     Medical  Pt continues in benzodiazepine and opiate withdrawal; (see prior notes). Pt given acetaminohen 650 mg @ 2015, Suboxone 8-2 Mg @ 2207, hydroxyzine 25 mg @ 2259, and clonidine 0.1 mg @ 0020, and Robaxin 500 mg @ 0048. COWS 14 - Provider notified. PRN dose of 8-2 mg of buprenorphine ordered. Spoke with pharmacy to verify - pharmacy ok'd additional dose.  After medication administration COWS 9 and 4;    Pt c/o diarrhea - pt declined offered PRN medications - accepted provided fluids

## 2023-10-22 NOTE — PLAN OF CARE
Problem: Adult Behavioral Health Plan of Care  Goal: Plan of Care Review  Outcome: Progressing  Flowsheets (Taken 10/22/2023 0800)  Patient Agreement with Plan of Care: agrees     Problem: Substance Withdrawal  Goal: Substance Withdrawal  Description: Signs and symptoms of listed problems will be absent or manageable.  Outcome: Progressing   Goal Outcome Evaluation:    Plan of Care Reviewed With: patient       Pt expressing significant discomfort this am as a result of early administration of suboxone.  Pt had rapid onset of opiate withdrawal symptoms after first dose of suboxone.  Suboxone dose was repeated 5 hours later and then was given again this am.  Pt is now feeling the symptoms have improved--his opiate and mssa scores have decreased.  Pt has been mostly staying in bed, getting up for vital signs and assessments.  He reports that he hasn't been able to sleep.  Pt instructed to stay awake today so that he can sleep at night.  Pt's affect is flat, mood is calm.  Pt is up ad kiara., with steady gait, his speech is clear.  Pt did not eat lunch but feels that he starting to get hungry and may possibly eat something for dinner.  Will continue to monitor withdrawal, assist with discharge planning and treatment options

## 2023-10-23 LAB
GGT SERPL-CCNC: 11 U/L (ref 8–61)
HBA1C MFR BLD: 5.3 %
TSH SERPL DL<=0.005 MIU/L-ACNC: 2.03 UIU/ML (ref 0.3–4.2)

## 2023-10-23 PROCEDURE — 128N000004 HC R&B CD ADULT

## 2023-10-23 PROCEDURE — H2032 ACTIVITY THERAPY, PER 15 MIN: HCPCS

## 2023-10-23 PROCEDURE — 87389 HIV-1 AG W/HIV-1&-2 AB AG IA: CPT | Performed by: PHYSICIAN ASSISTANT

## 2023-10-23 PROCEDURE — 99233 SBSQ HOSP IP/OBS HIGH 50: CPT | Performed by: PSYCHIATRY & NEUROLOGY

## 2023-10-23 PROCEDURE — 250N000013 HC RX MED GY IP 250 OP 250 PS 637: Performed by: PSYCHIATRY & NEUROLOGY

## 2023-10-23 PROCEDURE — 99222 1ST HOSP IP/OBS MODERATE 55: CPT | Performed by: PHYSICIAN ASSISTANT

## 2023-10-23 PROCEDURE — 36415 COLL VENOUS BLD VENIPUNCTURE: CPT | Performed by: PHYSICIAN ASSISTANT

## 2023-10-23 PROCEDURE — 87491 CHLMYD TRACH DNA AMP PROBE: CPT | Performed by: PHYSICIAN ASSISTANT

## 2023-10-23 PROCEDURE — 250N000013 HC RX MED GY IP 250 OP 250 PS 637: Performed by: CLINICAL NURSE SPECIALIST

## 2023-10-23 PROCEDURE — 250N000013 HC RX MED GY IP 250 OP 250 PS 637: Performed by: PHYSICIAN ASSISTANT

## 2023-10-23 PROCEDURE — 86803 HEPATITIS C AB TEST: CPT | Performed by: PHYSICIAN ASSISTANT

## 2023-10-23 RX ORDER — BUPRENORPHINE AND NALOXONE 4; 1 MG/1; MG/1
1 FILM, SOLUBLE BUCCAL; SUBLINGUAL
Status: DISCONTINUED | OUTPATIENT
Start: 2023-10-23 | End: 2023-10-24

## 2023-10-23 RX ORDER — SIMETHICONE 80 MG
80 TABLET,CHEWABLE ORAL EVERY 6 HOURS PRN
Status: DISCONTINUED | OUTPATIENT
Start: 2023-10-23 | End: 2023-10-27 | Stop reason: HOSPADM

## 2023-10-23 RX ADMIN — CLONIDINE HYDROCHLORIDE 0.1 MG: 0.1 TABLET ORAL at 02:20

## 2023-10-23 RX ADMIN — FLUOXETINE 40 MG: 20 CAPSULE ORAL at 08:12

## 2023-10-23 RX ADMIN — BUPRENORPHINE AND NALOXONE 1 FILM: 8; 2 FILM, SOLUBLE BUCCAL; SUBLINGUAL at 08:14

## 2023-10-23 RX ADMIN — SIMETHICONE 80 MG: 80 TABLET, CHEWABLE ORAL at 16:20

## 2023-10-23 RX ADMIN — ACETAMINOPHEN 650 MG: 325 TABLET, FILM COATED ORAL at 16:20

## 2023-10-23 RX ADMIN — PHENOBARBITAL 32.4 MG: 32.4 TABLET ORAL at 14:10

## 2023-10-23 RX ADMIN — HYDROXYZINE HYDROCHLORIDE 25 MG: 25 TABLET, FILM COATED ORAL at 16:20

## 2023-10-23 RX ADMIN — NICOTINE 1 PATCH: 14 PATCH, EXTENDED RELEASE TRANSDERMAL at 08:13

## 2023-10-23 RX ADMIN — DICYCLOMINE HYDROCHLORIDE 20 MG: 10 CAPSULE ORAL at 08:13

## 2023-10-23 RX ADMIN — PHENOBARBITAL 32.4 MG: 32.4 TABLET ORAL at 08:13

## 2023-10-23 RX ADMIN — BUPRENORPHINE AND NALOXONE 1 FILM: 4; 1 FILM, SOLUBLE BUCCAL; SUBLINGUAL at 16:20

## 2023-10-23 RX ADMIN — NICOTINE POLACRILEX 4 MG: 4 GUM, CHEWING BUCCAL at 16:20

## 2023-10-23 RX ADMIN — PHENOBARBITAL 32.4 MG: 32.4 TABLET ORAL at 20:36

## 2023-10-23 ASSESSMENT — ACTIVITIES OF DAILY LIVING (ADL)
HYGIENE/GROOMING: INDEPENDENT
ADLS_ACUITY_SCORE: 28
ORAL_HYGIENE: INDEPENDENT
DRESS: INDEPENDENT
ADLS_ACUITY_SCORE: 28
ADLS_ACUITY_SCORE: 28
HYGIENE/GROOMING: INDEPENDENT
ORAL_HYGIENE: INDEPENDENT
ADLS_ACUITY_SCORE: 28
DRESS: INDEPENDENT
ADLS_ACUITY_SCORE: 28
ADLS_ACUITY_SCORE: 28
LAUNDRY: WITH SUPERVISION
ADLS_ACUITY_SCORE: 28

## 2023-10-23 ASSESSMENT — ANXIETY QUESTIONNAIRES
5. BEING SO RESTLESS THAT IT IS HARD TO SIT STILL: SEVERAL DAYS
GAD7 TOTAL SCORE: 9
1. FEELING NERVOUS, ANXIOUS, OR ON EDGE: MORE THAN HALF THE DAYS
GAD7 TOTAL SCORE: 9
3. WORRYING TOO MUCH ABOUT DIFFERENT THINGS: SEVERAL DAYS
2. NOT BEING ABLE TO STOP OR CONTROL WORRYING: SEVERAL DAYS
IF YOU CHECKED OFF ANY PROBLEMS ON THIS QUESTIONNAIRE, HOW DIFFICULT HAVE THESE PROBLEMS MADE IT FOR YOU TO DO YOUR WORK, TAKE CARE OF THINGS AT HOME, OR GET ALONG WITH OTHER PEOPLE: EXTREMELY DIFFICULT
4. TROUBLE RELAXING: MORE THAN HALF THE DAYS
7. FEELING AFRAID AS IF SOMETHING AWFUL MIGHT HAPPEN: SEVERAL DAYS
6. BECOMING EASILY ANNOYED OR IRRITABLE: SEVERAL DAYS

## 2023-10-23 NOTE — PLAN OF CARE
Problem: Adult Behavioral Health Plan of Care  Goal: Adheres to Safety Considerations for Self and Others  Outcome: Progressing      Behavioral  Pt appeared sleeping comfortably overnight; no behavioral concerns noted;     Medical  Pt continues in opiate and benzodiazepine withdrawal. Pt endorsed symptoms of withdrawal @ 0220 -Temp: 98.2  F (36.8  C) Temp src: Skin BP: 122/82 Pulse: 80   Resp: 14 SpO2: 99 %     - MSSA 7 and COWS 5; PRN clonidine administered.     No new medical concerns noted.

## 2023-10-23 NOTE — CONSULTS
[facility]  Consult Note - Hospitalist Service  Date of Admission:  10/20/2023  Consult Requested by: Dr. Hazel   Reason for Consult: Medical evaluation     Assessment & Plan   Max H Bosworth is a 33 year old male admitted on 10/20/2023. He has a history of polysubstance use disorder who was admitted to station 3 a for polysubstance detox including opioids, benzodiazepines, alcohol, and methamphetamines.    Polysubstance abuse (opiates, benzodiazepine, alcohol, methamphetamine)  Patient reports longstanding use of opioids, primarily fentanyl.  He also endorses benzodiazepine use daily for the last 2 months, estimates 4 to 6 mg of Xanax daily.  He endorses methamphetamine use several times per week.  She also endorses alcohol use daily with approximately 6 beers per day for the last 2 months.  Denies history of alcohol withdrawal seizures, benzo withdrawal seizures, or DTs.  -Management per psychiatry  -Agree with Suboxone and phenobarbital  -Dicyclomine and simethicone as needed for GI distress    Depression  -Continue PTA fluoxetine    Health maintenance   Patient reports history of IV drug use.  Last use greater than 1 year ago.  Reports history of sharing and reusing needles.  Denies history of hepatitis C or HIV.  Patient request STI testing.  -Check hepatitis C and HIV  -Urine gonorrhea/chlamydia       The patient's care was discussed with the Patient.    Medicine will continue to follow-up hepatitis C, HIV, gonorrhea, chlamydia testing in a.m.  Please call with additional questions or concerns.    Clinically Significant Risk Factors                                    BENJAMÍN Aguilar  Hospitalist Service  Securely message with [employer] (more info)  Text page via [facility] Paging/Directory   ______________________________________________________________________    Chief Complaint   Polysubstance use    History is obtained from the patient    History of Present  Illness   Max H Bosworth is a 33 year old male who has a history of polysubstance use disorder who was admitted to station 3 a for polysubstance detox including opioids, benzodiazepines, alcohol, and methamphetamines.  Patient reports longstanding history of opioid use, primarily fentanyl.  He also reports benzo use on a daily basis, estimating 4 to 6 mg of Xanax daily.  He also notes methamphetamine use several times per week and drinks alcohol daily.  He reports drinking approximately 6 beers per day.  He denies any history of alcohol withdrawal seizures, benzo withdrawal seizures or DTs.  He notes recent history of GI issues with excessive gas.  Denies any diarrhea or constipation.  Denies any blood in stools.  Reports mild hesitancy with urination and mild decrease in amount of urine.  Denies any pain with urination.  Denies any concern for STIs, penile discharge.  Patient reports history of IV drug use, none within the last 1 year.  Does report he previously has refused and shared needles.  Denies any history of hepatitis C or HIV.  Denies any chest pain, shortness of breath, fevers, chills, rashes, numbness or tingling.      Past Medical History    No past medical history on file.    Past Surgical History   No past surgical history on file.    Medications   Medications Prior to Admission   Medication Sig Dispense Refill Last Dose    buprenorphine HCl-naloxone HCl (SUBOXONE) 8-2 MG per film Place 1 Film under the tongue daily   Past Week at unknown    FLUoxetine (PROZAC) 40 MG capsule Take 40 mg by mouth daily   Past Week at unknown          Social History   I have reviewed this patient's social history and updated it with pertinent information if needed.  Social History     Substance Use Topics    Alcohol use: Yes    Drug use: Yes     Types: Benzodiazepines, Methamphetamines, Opiates, Fentanyl        Physical Exam   Blood pressure 102/64, pulse 70, temperature 98  F (36.7  C), temperature source Temporal,  resp. rate 16, height 1.829 m (6'), weight 74.8 kg (165 lb), SpO2 100%.  GENERAL: Alert and oriented x 3. NAD.   HEENT: Anicteric sclera. PERRL. Mucous membranes moist and without lesions.   CV: RRR. S1, S2. No murmurs appreciated.   RESPIRATORY: Effort normal on RA. Lungs CTAB with no wheezing, rales, rhonchi.   GI: Abdomen soft and non distended, bowel sounds present. No tenderness, rebound, guarding.   MUSCULOSKELETAL: No joint swelling or tenderness. Moves all extremities.   NEUROLOGICAL: No focal deficits.   EXTREMITIES: No peripheral edema. Intact bilateral pedal pulses.   SKIN: No jaundice. No rashes.       Medical Decision Making       50 MINUTES SPENT BY ME on the date of service doing chart review, history, exam, documentation & further activities per the note.      Data   ROUTINE IP LABS (Last four results)  Recent Labs   Lab 10/20/23  2313      POTASSIUM 4.0   CHLORIDE 100   CO2 27   ANIONGAP 9   *   BUN 22.4*   CR 0.89   GUY 8.9   PROTTOTAL 6.4   ALBUMIN 4.5   BILITOTAL 0.2   ALKPHOS 72   AST 42   ALT 38        Recent Labs   Lab 10/20/23  2313   WBC 5.3   RBC 3.87*   HGB 12.4*   HCT 37.1*   MCV 96   MCH 32.0   MCHC 33.4   RDW 11.9        No lab results found in last 7 days.         Imaging results reviewed over the past 24 hrs:   No results found for this or any previous visit (from the past 24 hour(s)).

## 2023-10-23 NOTE — PLAN OF CARE
Problem: Adult Inpatient Plan of Care  Goal: Optimal Comfort and Wellbeing  Outcome: Progressing  Intervention: Provide Person-Centered Care  Recent Flowsheet Documentation  Taken 10/23/2023 1200 by Louise Navarrete, RN  Trust Relationship/Rapport: care explained  Taken 10/23/2023 1009 by Louise Navarrete, RN  Trust Relationship/Rapport: care explained   Goal Outcome Evaluation:    Plan of Care Reviewed With: patient          Pt visible, c/o of feeling tired and low motivation this morning, Bentyl administered for abdominal cramping with effect, Pt Polysubstance Abuse is being managed with scheduled Suboxone and Phenobarbital, MSSA this shift 3/2, COWS 4/1, Pt reported to Medicine hx of IV drug use, urine sample sent for Gonorrhea/Chlamydia. Discharge plan is treatment however pt has no insurance but is working with a friend who is an   on obtaining private insurance.

## 2023-10-23 NOTE — PLAN OF CARE
Goal Outcome Evaluation:    Plan of Care Reviewed With: patient      Problem: Substance Withdrawal  Goal: Substance Withdrawal  Description: Signs and symptoms of listed problems will be absent or manageable.  Outcome: Progressing     Pt presented with a full range affect, anxious mood. Endorsed body aches 4/10, PRN Tylenol effective, anxiety 4/10, PRN hydroxyzine was helpful, depression 3/10, denied SI, HI, hallucinations, and contracted for safety. C/o gas, PRN simethicone with some relief. Pt requested and was given PRN nicotine gum x 1. COWS was 5 & 4 ; MSSA was 2 & 2. Pt is on maintenance Suboxone and Phenobarb for opiates and benzo detox. Pt wanted a dose of 4-2 mg Suboxone because his home dose is 16, Dr. Hazel was notified. Doctor declined due the protocol of increasing by 4 mg in 24 hours, pt was informed, was agreeable. Pt encouraged to discuss with Ilir Chandra tomorrow. No other concerns expressed.

## 2023-10-23 NOTE — PROGRESS NOTES
Met with patient to complete comp assessment. Patient is in agreement to go to residential treatment as recommended. He reported he would like to go to [facility] however currently has no insurance. He reported his friend is an  and is currently working on getting him private insurance, he reports it should only take a couple days before it is active. Writer did ask if he would have somewhere to stay if he is discharged from detox and he stated he did. Writer also notified him that there would potentially be a large out of pocket cost on his end for [facility]. Writer will be unable to send referrals at this time due to lack of insurance.

## 2023-10-23 NOTE — PROGRESS NOTES
10/23/23 1800   Group Therapy Session   Group Attendance attended group session   Time Session Began 1645   Time Session Ended 1730   Total Time (minutes) 45   Total # Attendees 7   Group Type recreation   Group Topic Covered coping skills/lifestyle management   Group Session Detail healthy coping skills   Patient Response/Contribution cooperative with task   Patient Participation Detail Pt participated in Therapeutic Recreation group with focus on leisure education and acquisition of knowledge and skills. Pt was fully engaged and cooperative in group recreational intervention; leisure inventory. Pt was focused on the written portion for the first part of group and then contributed to the group discussion following. Pt discussed several recreational interests and positive coping skills that are healthy outlets. Pt mood was calm and was appropriate with interactions.

## 2023-10-23 NOTE — PROGRESS NOTES
[facility], [place]   Psychiatric Progress Note  Hospital Day: 2        Interim History:   The patient's care was discussed with the treatment team during the daily team meeting and/or staff's chart notes were reviewed.  Staff report patient has been visible in the milieu, continues to report withdrawal symptoms for both opiates and benzodiazepines, tolerating the phenobarbital, appeared to have some precipitated withdrawal with the suboxone dose over weekend but now improving, COWS 9 and MSSA 8 in evening and COWS 5 MSSA 7 overnight, receiving PRN comfort medications and taking scheduled medications as prescribed.     Upon interview, the patient reports that he is starting to feel better today, was having some loose stool, hot flashes, cold sweats, gooseflesh, body aches, feeling anxious, reports that having been having his withdrawal symptoms and he was back in [place] where he resides he would have requested to discharge because he was having significant cravings and urges to use due to his withdrawal symptoms.  He has been on higher doses of Suboxone in the past, he is agreeable to increase the dose and add 4 mg in the afternoon.  Reports that he is willing to be on a higher dose of Suboxone for management of his cravings and withdrawal with goal of hopefully tapering down in the future, reports he has had maintenance dose as low as 8 mg total in the day.  He confirmed he has been using Xanax for the past 2 months, had not been using benzodiazepines prior to this for a period of time, has had past regular use of benzodiazepines.  Reports he is tolerating the phenobarbital, it has been helpful.  He asked about being prescribed lorazepam, reviewed reasoning for detox protocol and utilizing the phenobarbital and he expressed understanding.  He has been able to eat today, did not get much sleep last night.  His mood has been down and anxious, denies any SI or HI.  Denies AVH.  He is  tolerating other medications.  Reports that he came to [place] specifically for substance use treatment, wanting to go to residential program, open to [facility], [facility] or other options.  Has met with case management and working with them to determine which program would be best.  No additional concerns.           Medications:      buprenorphine HCl-naloxone HCl  1 Film Sublingual Daily    FLUoxetine  40 mg Oral Daily    nicotine  1 patch Transdermal Daily    nicotine   Transdermal Q8H    PHENobarbital  32.4 mg Oral TID          Allergies:     Allergies   Allergen Reactions    Penicillins Hives    Wellbutrin [Bupropion] Hives          Labs:     Recent Results (from the past 48 hour(s))   Urine Drug Screen Panel    Collection Time: 10/21/23  8:55 AM   Result Value Ref Range    Amphetamines Urine Screen Positive (A) Screen Negative    Barbituates Urine Screen Negative Screen Negative    Benzodiazepine Urine Screen Negative Screen Negative    Cannabinoids Urine Screen Negative Screen Negative    Cocaine Urine Screen Negative Screen Negative    Fentanyl Qual Urine Screen Positive (A) Screen Negative    Opiates Urine Screen Negative Screen Negative    PCP Urine Screen Negative Screen Negative          Psychiatric Examination:     /73 (BP Location: Left arm, Patient Position: Chair)   Pulse 84   Temp 98  F (36.7  C) (Temporal)   Resp 16   Ht 1.829 m (6')   Wt 74.8 kg (165 lb)   SpO2 100%   BMI 22.38 kg/m    Weight is 165 lbs 0 oz  Body mass index is 22.38 kg/m .    Orthostatic Vitals       None            Appearance: awake, alert and adequately groomed  Attitude:  cooperative  Eye Contact:  good  Mood:  anxious and depressed  Affect:  mood congruent and intensity is blunted  Speech:  clear, coherent and normal prosody  Language: fluent and intact in English  Psychomotor, Gait, Musculoskeletal:  no evidence of tardive dyskinesia, dystonia, or tics  Thought Process:  logical, linear, and goal  oriented  Associations:  no loose associations  Thought Content:  no evidence of suicidal ideation or homicidal ideation and no evidence of psychotic thought  Insight:  good  Judgement:  intact  Oriented to:  time, person, and place  Attention Span and Concentration:  intact  Recent and Remote Memory:  intact  Fund of Knowledge:  appropriate           Precautions:     Behavioral Orders   Procedures    Code 1 - Restrict to Unit    Routine Programming     As clinically indicated    Status 15     Every 15 minutes.    Withdrawal precautions          Diagnoses:      Benzodiazepine use disorder, severe, dependence (H) with withdrawal   Opioid use disorder, severe, dependence with intoxication with complication (H) and withdrawal with hx of MAT with Suboxone   Alcohol use disorder, moderate  Methamphetamine use disorder, unspecified  Nicotine dependence and withdrawal   Major Depressive Disorder, moderate  History of eating disorder   Hx of IVDU         Assessment & Plan:   Assessment and hospital summary:  33-year-old male with history of polysubstance use, depression and eating disorder who presented to the ED seeking detox from benzodiazepines and opiates.  Also reported drinking alcohol recently.  Medically cleared in ED.  Admitted to  as a voluntary patient.  MSSA with phenobarbital started for benzodiazepine withdrawal, reported using 4-6mg of Xanax daily for past 2 months. COWS with suboxone started for opiate withdrawal, has been using Fentanyl almost daily for past 2 months as well. Denies hx of seizures or DTs. Reporting depression and anxiety sympotms, denying safety concerns including SI and HI. PTA fluoxetine continued for mood. IM consult not ordered over weekend, placed order today. Pt reports goals for hospitalization are to detox safely and then discharge to residential CD treatment.     Psychiatric treatment/interventions:  Medications:   -Continue MSSA with phenobarbital 32.4 3 times daily x 3 days  then plan to taper by 32.4 mg daily for benzodiazepine use disorder and withdrawal  -Continue COWS with Suboxone, will increase Suboxone dose to include 4 mg in the afternoon, will continue with 8 mg in the morning, will continue to adjust dose as indicated  -Continue PTA fluoxetine 40 mg daily for mood  -Continue nicotine replacement including daily patch, educate patient on benefits of cessation  -Continue other  PRN medications without changes       The risks, benefits, alternatives and side effects have been discussed and are understood by the patient.     Laboratory/Imaging: Reviewed: 10/20/2023-CMP with BUN 22.4 (H), glucose 124 (H) otherwise WNL; CBC with hemoglobin 12.4 (L), hematocrit 37.1 (L), RBC 3.87 (L), ANC 1.5 (L) otherwise WNL; UDS positive for amphetamines and fentanyl; EtOH <0.01; added GGT, TSH and hemoglobin A1c and lipid panel to complete admission labs     Patient will be treated in therapeutic milieu with appropriate individual and group therapies as described.     Medical treatment/interventions:  Medical concerns:   1) Polysubstance use including benzodiazepines, alcohol and opiates with withdrawal   -continue MSSA as above with phenobarbital  -continue COWS as above with suboxone  -continue PRN comfort medications as ordered    2) IM consult placed for medical concerns, appreciate assistance, per consult note 10/23:   Max H Bosworth is a 33 year old male admitted on 10/20/2023. He has a history of polysubstance use disorder who was admitted to station 3 a for polysubstance detox including opioids, benzodiazepines, alcohol, and methamphetamines.     Polysubstance abuse (opiates, benzodiazepine, alcohol, methamphetamine)  Patient reports longstanding use of opioids, primarily fentanyl.  He also endorses benzodiazepine use daily for the last 2 months, estimates 4 to 6 mg of Xanax daily.  He endorses methamphetamine use several times per week.  She also endorses alcohol use daily with  approximately 6 beers per day for the last 2 months.  Denies history of alcohol withdrawal seizures, benzo withdrawal seizures, or DTs.  -Management per psychiatry  -Agree with Suboxone and phenobarbital  -Dicyclomine and simethicone as needed for GI distress     Depression  -Continue PTA fluoxetine     Health maintenance   Patient reports history of IV drug use.  Last use greater than 1 year ago.  Reports history of sharing and reusing needles.  Denies history of hepatitis C or HIV.  Patient request STI testing.  -Check hepatitis C and HIV  -Urine gonorrhea/chlamydia           The patient's care was discussed with the Patient.     Medicine will continue to follow-up hepatitis C, HIV, gonorrhea, chlamydia testing in a.m.  Please call with additional questions or concerns.        Clinically Significant Risk Factors                                         BENJAMÍN Aguilar  Hospitalist Service          Disposition Plan   Reason for ongoing admission: requires detoxification from substance that poses a risk of bodily harm during withdrawal period  Discharge location: Chemical dependency treatment facility  Discharge Medications: not ordered  Follow-up Appointments: not scheduled  Legal Status: voluntary    >50 min total time that was spent in counseling and coordination of care with staff, reviewing medical record, educating patient about treatment options, side effects and benefits and alternative treatments for medications, providing supportive therapy and redirection regarding above symptoms.     This document is created with the help of Dragon dictation system.  All grammatical/typing errors or context distortion are unintentional and inherent to software.    Patient has been seen and evaluated by Cat webb DO.

## 2023-10-23 NOTE — PLAN OF CARE
Goal Outcome Evaluation:    Plan of Care Reviewed With: patient      Problem: Substance Withdrawal  Goal: Substance Withdrawal  Description: Signs and symptoms of listed problems will be absent or manageable.  Outcome: Progressing    Pt presented with a flat affect, anxious mood. Endorsed body aches, sweating, slight tremor noted, HR was in the 90s. COWS was 9 & MSSA was 8, PRN Suboxone &clonidine given, with some relief. 2000 assessment, COWS was 6 and MSSA was 7, pt got scheduled Phenobarbital given. PRN hydroxyzine, Robaxin, & Trazodone given at 2213. Pt reports that he does not think that he can sleep without a dose of Suboxone, encouraged to let the medications work and see if he falls a sleep, will to night RN to continue monitoring.

## 2023-10-23 NOTE — DISCHARGE INSTRUCTIONS
Behavioral Discharge Planning and Instructions  THANK YOU FOR CHOOSING [facility]  3A  [phone]    Summary: You were admitted to [facility] on 10/20/23  for detoxification from fentanyl.  A medical exam was performed that included lab work. You have met with a  and opted to enter treatment at [facility].  Please take care and make your recovery a daily priority, Max!  It was a pleasure working with you and the entire treatment team here wishes you the very best in your recovery!     Recommendation:  Please attend your schedule intake at [facility] at 11:30 am on 10/27/2023 and follow any and all recommendations.    Main Diagnoses:  Per Kimberly Moya PA   Polysubstance Abuse  Depression  Health Maintenance    Major Treatments, Procedures and Findings:  You were treated for benzodiazapine detoxification using phenobarital. You completed a chemical dependency assessment. You had labs drawn and those results were reviewed with you. Please take a copy of your lab work with you to your next primary care provider appointment.    Symptoms to Report:  If you experience more anxiety, confusion, sleeplessness, deep sadness or thoughts of suicide, notify your treatment team or notify your primary care provider. IF ANY OF THE SYMPTOMS YOU ARE EXPERIENCING ARE A MEDICAL EMERGENCY CALL 911 IMMEDIATELY.     Lifestyle Adjustment: Adjust your lifestyle to get enough sleep, relaxation, exercise and good nutrition. Continue to develop healthy coping skills to decrease stress and promote a sober living environment. Do not use mood altering substances including alcohol, illegal drugs or addictive medications other than what is currently prescribed.     Disposition: [facility] with Sober Housing.    Facts about COVID19 at www.cdc.gov/COVID19 and www.[place].gov/covid19    Keeping hands clean is one of the most important steps we can take to avoid getting sick and spreading germs to others.  Please  wash your hands frequently and lather with soap for at least 20 seconds!    Follow-up Appointment:   Pt does not have established care, he is working on getting  Private Insurance, advised and educated on importance of follow up care with PCP for health maintenance.  [facility]   Date/time:   Address [address], [place] [address]  Phone: [phone]    Recovery apps for your phone to locate current in person and zoom recovery meetings  Pink McPherson - meeting castillo  AA  - meeting castillo  Meeting guide - meeting castillo  Quick NA meeting - meeting castillo  Kelly- has various apps    Resources:  Some AA/NA meetings are being held online however most have returned to in-person or a hybrid combination please check online to verify*  Need Support Now? If you or someone you know is struggling or in crisis, help is available. Call or text [employer] or chat [facility].[employer]  AA meetings search for them at: https://aa-intergroup.org (worldwide meeting listings)  AA meetings for [place] area can be found online at: https://a[place].org (click local online meetings listings)  NA meetings for [place] area can be found online at: https://www.nam[place].org  (click find a meeting)  AA and NA Sponsors are excellent resources for support and you can find one at any support group meeting.   Alcoholics Anonymous (https://aa.org/): for information 24 hours/day  AA Intergroup service office in [place] (http://www.aastpaul.org/) 190.119.7440  AA Intergroup service office in [facility]: [phone]. (http://www.a[place].org/)  Narcotics Anonymous (www.nam[place].org) (865) 214-2052  https://aafairviewriverside.org/meetings  SMART Recovery - self management for addiction recovery:  www.smartrecovery.org  Pathways ~ A Health Crisis Resource & Support Center:  588.812.2251.  https://prescribetoprevent.org/patient-education/videos/  http://www.harmreduction.org  [facility] [phone]  Support  Group:  AA/NA and Sponsor/support.  National [place] on [place] Illness (www.[place].hortensia.org): 198.317.9379 or 999-834-2277.  Alcoholics Anonymous (www.alcoholics-anonymous.org): Check your phone book for your local chapter.  Suicide Awareness Voices of Education (SAVE) (www.save.org): 320-435-UJJZ (2073)  National Suicide Prevention Line (www.mentalhealthmn.org): 129-751-FQLR (0580)  Mental Health Consumer/Survivor Network of [place] (www.mhcsn.net): 401.592.6090 or 506-934-4967  Mental Health Association of [place] (www.mentalhealth.org): 682.189.5416 or 681-113-6512   Substance Abuse and Mental Health Services (www.samhsa.gov)  [place] Opioid Prevention Coalition: www.opioidcoalition.org    [place] Recovery Connection ([facility])  [facility] connects people seeking recovery to resources that help foster and sustain long-term recovery.  Whether you are seeking resources for treatment, transportation, housing, job training, education, health care or other pathways to recovery, [facility] is a great place to start.  297.523.2205.  www.[employer].org    Great Pod casts for nutrition and wellness  Listen on Apple Podcasts  Dishing Up Nutrition   [employer]   Nutrition       Understand the connection between what you eat and how you feel. Hosted by licensed nutritionists and dietitians from [employer] we share practical, real-life solutions for healthier living through nutrition.     General Medication Instructions:   See your medication sheet(s) for instructions.   Take all medications as prescribed.  Make no changes unless your primary care provider suggests them.   Go to all your primary care provider visits.  Be sure to have all your required lab tests. This way, your medicines can be refilled on time.  Do not use any forms of alcohol.    Please Note:  If you have any questions at anytime after you are discharged please call [facility] detox unit 3AW at [phone].  [facility]  Behavioral Intake [phone]  Medical Records call [phone]  Outpatient Behavioral Intake call [phone]  LP+ Wait List/Bed Availability call [phone]    Please remember to take all of your behavioral discharge planning and lab paperwork to any follow up appointments, it contains your lab results, diagnosis, medication list and discharge recommendations.      THANK YOU FOR CHOOSING [facility]

## 2023-10-23 NOTE — PROGRESS NOTES
"  Unit 3A    UNIVERSAL ADULT DIAGNOSTIC ASSESSMENT - Substance Use Disorder    Provider Name and Credentials: JANNETTE Goldberg    PATIENT'S NAME: Max H Bosworth  PREFERRED NAME: Zhen  PRONOUNS:       MRN: [id number]  : 1990   Last 4 SSN:   ACCT. NUMBER:  [id number]  DATE OF SERVICE: 10/23/2023   START TIME: 1:12 pm  END TIME: 2:12 pm  PREFERRED PHONE: [phone]   EMAIL: No e-mail address on record   May we leave a program related message: Yes  SERVICE MODALITY:  In-person      Identifying Information:  Patient is a 33 year old,  male who was referred for an assessment by self. The pronoun use throughout this assessment reflects the patient's chosen pronoun. Patient attended the session alone.     Chief Complaint:   The reason for seeking services at this time is: \"To get clean, rebuild my life and relationships with my fiance and her parents\"  The problem(s) began 24. Patient has attempted to resolve these concerns in the past through attending treatment .  Patient does not appear to be in severe withdrawal, an imminent safety risk to self or others, or requiring immediate medical attention and may proceed with the assessment interview.    Social/Family History:  Patient reported he grew up in [place]. Patient was raised by mother and father. Patient reported that his childhood was lonely and unstable.  Patient describes current relationships with family of origin as starined.      The patient describes his cultural background as unknown.  Cultural influences and impact on patient's life structure, values, norms, and healthcare:  None .  Contextual influences on patient's health include: Contextual Factors: Individual Factors none .  Patient identified his preferred language to be English. Patient reported he does not need the assistance of an  or other support involved in therapy.     Patient reported had no significant delays in developmental tasks.  Patient's " highest education level was some college. Patient identified the following learning problems: none reported.  Patient reports he is able to understand written materials.    Patient's current relationship status is partnered / significant other for 1 year.   Patient identified his sexual orientation as heterosexual.  Patient reported having zero child(ed).     Patient's current living/housing situation involves homelessness.   .  Patient lives with patient reports his normal living situation is with his finance however currently has no place to go and he reports that housing is not stable. Patient identified partner, siblings, and friends as part of his support system.  Patient identified the quality of these relationships as fair.      Patient reports he is not involved in [employer] activities. Patients reports spirituality impacts his recovery in the following ways:  Patient reports he lacks spirituality.     Patient reports engaging in the following recreational/leisure activities: working out. Patient reports engaging in the following recreation/leisure activities while using: isolate. Patient reports the following people are supportive of recovery: .  Patient is currently unemployed.  Patient reports his income is obtained through  nothing  .  Patient does identify finances as a current stressor. Cultural and socioeconomic factors do not affect the patient's access to services.    Patient reports the following substance related arrests or legal issues: Pending DWI charge.  Patient denies being on probation / parole / under the jurisdiction of the court.    Patient's Strengths and Limitations:  Patient identified the following strengths or resources that will help him succeed in treatment: outside. Things that may interfere with the patient's success in treatment include: none identified.     Assessments:  The following assessments were completed by patient for this visit:  GAD7:       10/23/2023      1:00 PM   ANITHA-7 SCORE   Total Score 9     PROMIS 10-Global Health (all questions and answers displayed):       10/23/2023     1:00 PM   PROMIS 10   In general, would you say your health is: 2   In general, would you say your quality of life is: 2   In general, how would you rate your physical health? 2   In general, how would you rate your mental health, including your mood and your ability to think? 2   In general, how would you rate your satisfaction with your social activities and relationships? 1   In general, please rate how well you carry out your usual social activities and roles. (This includes activities at home, at work and in your community, and responsibilities as a parent, child, spouse, employee, friend, etc.) 3   To what extent are you able to carry out your everyday physical activities such as walking, climbing stairs, carrying groceries, or moving a chair? 4   In the past 7 days, how often have you been bothered by emotional problems such as feeling anxious, depressed, or irritable? 5   In the past 7 days, how would you rate your fatigue on average? 4   In the past 7 days, how would you rate your pain on average, where 0 means no pain, and 10 means worst imaginable pain? 5   Global Mental Health Score 6   Global Physical Health Score 11   PROMIS TOTAL - SUBSCORES 17     GAIN-sliding scale:      10/23/2023     1:00 PM   When was the last time that you had significant problems...   with feeling very trapped, lonely, sad, blue, depressed or hopeless about the future? Past month   with sleep trouble, such as bad dreams, sleeping restlessly, or falling asleep during the day? 1+ years ago   with feeling very anxious, nervous, tense, scared, panicked or like something bad was going to happen? Past month   with becoming very distressed & upset when something reminded you of the past? Past month   with thinking about ending your life or committing suicide? Never          10/23/2023     1:00 PM   When was the  last time that you did the following things 2 or more times?   Lied or conned to get things you wanted or to avoid having to do something? Past month   Had a hard time paying attention at school, work or home? Past month   Had a hard time listening to instructions at school, work or home? 1+ years ago   Were a bully or threatened other people? Never   Started physical fights with other people? 1+ years ago       Personal and Family Medical History:   Patient did report a family history of mental health concerns.  Patient reports the following family history: Parents have depression and PTSD, one brother has depression and anxiety and another brother has depression.  No family history on file.     Patient reported the following previous mental health diagnoses: depression and anxiety.  Patient reports his primary mental health symptoms include: negative self talk, self doubt, isolation and panic attacks and these do impact his ability to function.   Patient has received mental health services in the past: patient reports he has attended therapy in the past.  Psychiatric Hospitalizations: None.  Patient denies a history of civil commitment.  Current mental health services/providers include:  Patient denies any current MH providers.    Patient has not had a physical exam to rule out medical causes for current symptoms.  Date of last physical exam was greater than a year ago and client was encouraged to schedule an exam with PCP. The patient does not have a Primary Care Provider and was encouraged to establish care with a PCP.. Patient reports no current medical concerns.  Patient denies any issues with pain.   There are not significant appetite / nutritional concerns / weight changes. Patient does  report a history of an eating disorder. Patient does not report a history of head injury / trauma / cognitive impairment.      Patient reports current meds as:   Outpatient Medications Marked as Taking for the 10/20/23  encounter (Hospital Encounter)   Medication Sig    buprenorphine HCl-naloxone HCl (SUBOXONE) 8-2 MG per film Place 1 Film under the tongue daily    FLUoxetine (PROZAC) 40 MG capsule Take 40 mg by mouth daily       Medication Adherence:  Patient reports taking prescribed medications as prescribed.    Patient Allergies:    Allergies   Allergen Reactions    Penicillins Hives    Wellbutrin [Bupropion] Hives       Medical History:  No past medical history on file.    Substance Use:  Patient reported the following biological family members or relatives with chemical health issues:  entire immediate family.. Patient has received substance use disorder and/or gambling treatment in the past.  Patient reports the following dates and locations of treatment services:  Patient reports sevral treatment attempts while living in [place] . Patient has been to detox. Patient is not currently receiving any chemical dependency treatment. Patient reports they have attended the following support groups: 12 step meetings in the past.        Substance Age of first use Pattern and duration of use (include amounts and frequency) Date of last use     Withdrawal potential Route of administration   Has not used Alcohol        Has not used Marijuana            Has used Amphetamines   24 1/2 gram or less daily 10/21/23 No smoked   Has not used Cocaine/ crack           Has not used Hallucinogens        Has not used Inhalants        Has not used Heroin        Has used Other Opiates 30 Fentanyl - 1/2 gram a day  10/21/23 No smoked   Has not used Benzodiazepine          Has not used Barbiturates        Has not used Over the counter meds.        Has not used Caffeine        Has not used Nicotine         Has not used other substances not listed above:  Identify:              Patient reported the following problems as a result of their substance use: DUI, family problems, financial problems, legal issues, occupational / vocational problems, and  relationship problems.  Patient is concerned about substance use.     Patient reports experiencing the following withdrawal symptoms within the past 12 months: none and the following within the past 30 days: shaky/jittery/tremors, unable to sleep, agitation, headache, fatigue, sad/depressed feeling, muscle aches, sensitivity to noise, diminished appetite, and anxiety/worry.   Patients reports urges to use Heroin and Other Opiates Synthetic.  Patient reports he has used more Heroin and Other Opiates Synthetic than intended and over a longer period of time than intended. Patient reports he has had unsuccessful attempts to cut down or control use of Heroin and Other Opiates Synthetic.  Patient reports longest period of abstinence was 2 years and return to use was due to lack of gratitude and arrogance. Patient reports he has needed to use more Heroin and Other Opiates Synthetic to achieve the same effect.  Patient does  report diminished effect with use of same amount of Heroin and Other Opiates Synthetic.     Patient does  report a great deal of time is spent in activities necessary to obtain, use, or recover from Heroin and Other Opiates Synthetic effects.  Patient does  report important social, occupational, or recreational activities are given up or reduced because of Heroin and Other Opiates Synthetic use.  Heroin and Other Opiates Synthetic use is continued despite knowledge of having a persistent or recurrent physical or psychological problem that is likely to have caused or exacerbated by use.  Patient reports the following problem behaviors while under the influence of substances driving amd using.     Patient reports his recovery goals are Get into residential treatment, get off Suboxone and return home to [facility] .     Patient reports substance use has not impacted his ability to function in a school setting. Patient reports substance use has impacted his ability to function in a work setting.  Patients  demographics and history impact his recovery in the following ways:  Lack of servicies.     Patient does have a history of gambling concerns and/or treatment Several treatment attempt while living in [place]. Patient does not have other addictive behaviors he is concerned about .         Significant Losses / Trauma / Abuse / Neglect Issues:   Patient did not serve in the .  There are indications or report of significant loss, trauma, abuse or neglect issues related to: death of family and several friends due to overdosing  .  Concerns for possible neglect are not present.     Safety Assessment:   Current Safety Concerns:  [place] Suicide Severity Rating Scale (Short Version)      10/20/2023    10:44 PM 10/20/2023    11:34 PM 10/21/2023     8:53 AM 10/21/2023    11:00 AM   [place] Suicide Severity Rating (Short Version)   Over the past 2 weeks have you felt down, depressed, or hopeless? no      Over the past 2 weeks have you had thoughts of killing yourself? no      Have you ever attempted to kill yourself? no      Q1 Wished to be Dead (Past Month)   no no   Comments  Denies SI     Q2 Suicidal Thoughts (Past Month)   no no   Q6 Suicide Behavior (Lifetime)   no no   Interventions   Monitored via video      Patient denies current homicidal ideation and behaviors.  Patient denies current self-injurious ideation and behaviors.    Patient denied risk behaviors associated with substance use.  Patient denies any high risk behaviors associated with mental health symptoms.  Patient reports the following current concerns for their personal safety: None.  Patient reports there are not firearms in the house. There are no firearms in the home..     History of Safety Concerns:  Patient denied a history of homicidal ideation.     Patient denied a history of personal safety concerns.    Patient denied a history of assaultive behaviors.    Patient denied a history of sexual assault behaviors.     Patient denied a history  of risk behaviors associated with substance use.  Patient denies any history of high risk behaviors associated with mental health symptoms.  Patient reports the following protective factors: dedication to family/friends, sense of personal control or determination, access to a variety of clinical interventions, and pets    Risk Plan:  See Recommendations for Safety and Risk Management Plan    Review of Symptoms per patient report:  Substance Use:  passing out, daily use, substance related legal problems, work absence due to substance use, family relationship problems due to substance use, social problems related to substance use, driving under the influence, riding with someone under the influence, and cravings/urges to use     Collateral Contact Summary:   Collateral contacts contributing to this assessment:  NA    If court related records were reviewed, summarize here: NA    Information from collateral contacts supported/largely agreed with information from the client and associated risk ratings.    Information in this assessment was obtained from the medical record and provided by patient who is a good historian.    Patient will have open access to their mental health medical record.    Diagnostic Criteria: 1.) Alcohol/drug is often taken in larger amounts or over a longer period than was intended.  Met for Opiates.  2.) There is a persistent desire or unsuccessful efforts to cut down or control alcohol/drug use.  Met for Opiates.  3.) A great deal of time is spent in activities necessary to obtain alcohol, use alcohol, or recover from its effects.  Met for Opiates.  4.) Craving, or a strong desire or urge to use alcohol/drug.  Met for Opiates.  5.) Recurrent alcohol/drug use resulting in a failure to fulfill major role obligations at work, school or home.  Met for Opiates.  6.) Continued alcohol use despite having persistent or recurrent social or interpersonal problems caused or exacerbated by the effects of  alcohol/drug.  Met for Opiates.  7.) Important social, occupational, or recreational activities are given up or reduced because of alcohol/drug use.  Met for Opiates.  8.) Recurrent alcohol/drug use in situations in which it is physically hazardous.  Met for Opiates.  9.) Alcohol/drug use is continued despite knowledge of having a persistent or recurrent physical or psychological problem that is likely to have been caused or exacerbated by alcohol.  Met for Opiates.  10.) Tolerance, as defined by either of the following: A need for markedly increased amounts of alcohol/drug to achieve intoxication or desired effect..  Met for Opiates.  11.) Withdrawal, as manifested by either of the following: The characteristic withdrawal syndrome for alcohol/drug (refer to Criteria A and B of the criteria set for alcohol/drug withdrawal).. Met for Opiates.     As evidenced by self report and criteria, client meets the following DSM5 Diagnoses:   (Sustained by DSM5 Criteria Listed Above)  Opioid Use Disorder, Specify if:  In a controlled environment with a severity of:  304.00 (F11.20) Severe.    Recommendations:     1. Plan for Safety and Risk Management:  Recommended that patient call 911 or go to the local ED should there be a change in any of these risk factors..      Report to child / adult protection services was NA.     2. [facility] Referrals:   Recommendations:  Residential Treatment.  Patient reports they are willing to follow these recommendations.     Patient would like the following family or other support people involved in their treatment: Patient is unsure. Patient does not have a history of opiate use.    3. Mental Health Referrals:  Patient would benefit from having a psychiatrists and therapist.       4. Patient identified no cultural concerns that need to be addressed in treatment.    5. Recommendations for treatment focus:   .     Clinical Substantiation:  Summary: Discussed with pt their desired outcome; reviewed  living situation and community supports; reviewed type of use and risk factors for continued use. Risk ratings/justification below:   Dimension 1 -  Acute Intoxication/Withdrawal: 0 - No Problem    Dimension 2 - Biomedical: 0 - No Problem    Dimension 3 - Emotional/Behavioral/Cognitive Conditions: 2 - Moderate Problem    Dimension 4 - Readiness to Change:  2 - Moderate Problem    Dimension 5 - Relapse/Continued Use/ Continued Problem Potential: 4 - Extreme Problem    Dimension 6 - Recovery Environment:  4 - Extreme Problem      Placement/Program/Barriers Identified: Patient does not have health insurance.     Referral: Patient is requesting [facility] Assessment ID: [facility]  is down    Provider Name/ Credentials:  JANNETTE Goldberg  October 23, 2023

## 2023-10-24 LAB
C TRACH DNA SPEC QL PROBE+SIG AMP: NEGATIVE
CHOLEST SERPL-MCNC: 127 MG/DL
HCV AB SERPL QL IA: NONREACTIVE
HDLC SERPL-MCNC: 47 MG/DL
HIV 1+2 AB+HIV1 P24 AG SERPL QL IA: NONREACTIVE
LDLC SERPL CALC-MCNC: 49 MG/DL
N GONORRHOEA DNA SPEC QL NAA+PROBE: NEGATIVE
NONHDLC SERPL-MCNC: 80 MG/DL
TRIGL SERPL-MCNC: 154 MG/DL

## 2023-10-24 PROCEDURE — 36415 COLL VENOUS BLD VENIPUNCTURE: CPT | Performed by: PSYCHIATRY & NEUROLOGY

## 2023-10-24 PROCEDURE — 99233 SBSQ HOSP IP/OBS HIGH 50: CPT | Performed by: PSYCHIATRY & NEUROLOGY

## 2023-10-24 PROCEDURE — 250N000013 HC RX MED GY IP 250 OP 250 PS 637: Performed by: PSYCHIATRY & NEUROLOGY

## 2023-10-24 PROCEDURE — H2032 ACTIVITY THERAPY, PER 15 MIN: HCPCS

## 2023-10-24 PROCEDURE — 250N000013 HC RX MED GY IP 250 OP 250 PS 637: Performed by: CLINICAL NURSE SPECIALIST

## 2023-10-24 PROCEDURE — 80061 LIPID PANEL: CPT | Performed by: PSYCHIATRY & NEUROLOGY

## 2023-10-24 PROCEDURE — 128N000004 HC R&B CD ADULT

## 2023-10-24 RX ORDER — PHENOBARBITAL 32.4 MG/1
32.4 TABLET ORAL 2 TIMES DAILY
Status: COMPLETED | OUTPATIENT
Start: 2023-10-24 | End: 2023-10-25

## 2023-10-24 RX ORDER — BUPRENORPHINE AND NALOXONE 4; 1 MG/1; MG/1
1 FILM, SOLUBLE BUCCAL; SUBLINGUAL 2 TIMES DAILY
Status: DISCONTINUED | OUTPATIENT
Start: 2023-10-24 | End: 2023-10-27 | Stop reason: HOSPADM

## 2023-10-24 RX ADMIN — NICOTINE POLACRILEX 4 MG: 4 GUM, CHEWING BUCCAL at 04:48

## 2023-10-24 RX ADMIN — BUPRENORPHINE AND NALOXONE 1 FILM: 4; 1 FILM, SOLUBLE BUCCAL; SUBLINGUAL at 16:13

## 2023-10-24 RX ADMIN — HYDROXYZINE HYDROCHLORIDE 25 MG: 25 TABLET, FILM COATED ORAL at 04:10

## 2023-10-24 RX ADMIN — TRAZODONE HYDROCHLORIDE 50 MG: 50 TABLET ORAL at 22:33

## 2023-10-24 RX ADMIN — BUPRENORPHINE AND NALOXONE 1 FILM: 4; 1 FILM, SOLUBLE BUCCAL; SUBLINGUAL at 20:03

## 2023-10-24 RX ADMIN — PHENOBARBITAL 32.4 MG: 32.4 TABLET ORAL at 20:03

## 2023-10-24 RX ADMIN — NICOTINE POLACRILEX 4 MG: 4 GUM, CHEWING BUCCAL at 18:41

## 2023-10-24 RX ADMIN — NICOTINE 1 PATCH: 14 PATCH, EXTENDED RELEASE TRANSDERMAL at 07:53

## 2023-10-24 RX ADMIN — PHENOBARBITAL 32.4 MG: 32.4 TABLET ORAL at 07:54

## 2023-10-24 RX ADMIN — FLUOXETINE 40 MG: 20 CAPSULE ORAL at 07:48

## 2023-10-24 RX ADMIN — NICOTINE POLACRILEX 4 MG: 4 GUM, CHEWING BUCCAL at 07:21

## 2023-10-24 RX ADMIN — BUPRENORPHINE AND NALOXONE 1 FILM: 8; 2 FILM, SOLUBLE BUCCAL; SUBLINGUAL at 07:48

## 2023-10-24 ASSESSMENT — ACTIVITIES OF DAILY LIVING (ADL)
ADLS_ACUITY_SCORE: 29
ADLS_ACUITY_SCORE: 28
ADLS_ACUITY_SCORE: 29
ADLS_ACUITY_SCORE: 28
ADLS_ACUITY_SCORE: 29
ADLS_ACUITY_SCORE: 28
ADLS_ACUITY_SCORE: 29

## 2023-10-24 NOTE — PROGRESS NOTES
Brief medicine note:     Followed up on patients STI testing.     Please see consult note 10/23/23 for details.     Today's vital signs, medications and nursing notes were reviewed.     /80 (BP Location: Left arm, Patient Position: Sitting, Cuff Size: Adult Regular)   Pulse 59   Temp 97.8  F (36.6  C) (Temporal)   Resp 16   Ht 1.829 m (6')   Wt 74.8 kg (165 lb)   SpO2 100%   BMI 22.38 kg/m        Health maintenance   Patient reports history of IV drug use.  Last use greater than 1 year ago.  Reports history of sharing and reusing needles.  Denies history of hepatitis C or HIV.  Patient requested STI testing. HIV and hep C nonreactive, gonorrhea/chlamydia negative.     Currently patient is medically stable and medicine will sign off. Thank you for allowing us to be a part of this patients care. Please notify on call SANAM if any intercurrent medical issues arise.     Kimberly LAZAR [facility]  Securely message with the [employer] Web Console (learn more here)  Text page via [employer] Paging/Directory

## 2023-10-24 NOTE — PROGRESS NOTES
"  10/24/23 1700    Group Therapy Session   Time Session Began 1650   Time Session Ended 1730   Total Time (minutes) 40   Total # Attendees 4   Group Type expressive therapy   Group Topic Covered disease of addiction/choices in recovery;coping skills/lifestyle management   Group Session Detail \"Starting Over\"   Patient Response/Contribution cooperative with task   Patient Participation Detail Cooperatively engaged in group on \"Starting Over\" with live, original music shared on the topic with discussion afterwards.  Open and positive group participant, sharing on their disease process as it relates to starting over.      "

## 2023-10-24 NOTE — PLAN OF CARE
Problem: Adult Inpatient Plan of Care  Goal: Optimal Comfort and Wellbeing  Outcome: Progressing   Goal Outcome Evaluation:    Plan of Care Reviewed With: patient             Pt VSS, endorsing a good appetite, COWS 4/1,  MSSA 2,/1,  tolerating meds, reports feeling better today than yesterday, rates anxiety 6,  essential oil patch provided , pt took a shower, denies depression, awaiting insurance approval, spends most of the time in his room.    Phenobarb taper started today, Suboxone dose increased to target withdrawal and cravings.     Pt appears comfortable no reported symptoms this shift.

## 2023-10-24 NOTE — PLAN OF CARE
Goal Outcome Evaluation:   Patient awake mid-shift and receives warm pack and hydroxyzine for restless sleep, patient returns to bed for a short time ,then up early for the day,

## 2023-10-24 NOTE — PROGRESS NOTES
"[facility], [place]   Psychiatric Progress Note  Hospital Day: 3        Interim History:   The patient's care was discussed with the treatment team during the daily team meeting and/or staff's chart notes were reviewed.  Staff report patient has been visible in the milieu, taking medications as prescribed, wanting additional dose of suboxone at HS, COWS improving at 5 and 4, MSSA 2 and 2, continues on phenobarbital, having difficulty with sleep, received PRN medications, up early this morning.     Upon interview, patient reports he continues to feel better each day, having some anxiety, poor sleep. Denies further body aches, loose stool, nausea or vomiting. Has been eating and drinking without issue. Wanting to increase suboxone further today, will add HS dose. He may be interested in Sublocade in future with goal of getting off daily Suboxone. Discussed this medication is typically started in community and encouraged him to explore with outpatient providers after period of stability following treatment. He states mood is \"good\", denies SI, HI or AVH. Understands plan to start phenobarbital taper today. He is working with a friend and his brother to address his lack of insurance to help cover treatment. He continues to be interested in residential CD treatment, most interested in [facility]. Reviewed all resulted lab work with patient. Informed pt writer will be off service tomorrow and another physician will be taking over his care. He expressed understanding. No additional concerns.          Medications:      buprenorphine HCl-naloxone HCl  1 Film Sublingual Daily at 4 pm    buprenorphine HCl-naloxone HCl  1 Film Sublingual Daily    FLUoxetine  40 mg Oral Daily    nicotine  1 patch Transdermal Daily    nicotine   Transdermal Q8H    PHENobarbital  32.4 mg Oral TID          Allergies:     Allergies   Allergen Reactions    Penicillins Hives    Wellbutrin [Bupropion] Hives          Labs: " "    Recent Results (from the past 48 hour(s))   Lipid panel reflex to direct LDL    Collection Time: 10/24/23  6:51 AM   Result Value Ref Range    Cholesterol 127 <200 mg/dL    Triglycerides 154 (H) <150 mg/dL    Direct Measure HDL 47 >=40 mg/dL    LDL Cholesterol Calculated 49 <=100 mg/dL    Non HDL Cholesterol 80 <130 mg/dL          Psychiatric Examination:     /74   Pulse 69   Temp 98.9  F (37.2  C) (Oral)   Resp 16   Ht 1.829 m (6')   Wt 74.8 kg (165 lb)   SpO2 99%   BMI 22.38 kg/m    Weight is 165 lbs 0 oz  Body mass index is 22.38 kg/m .    Orthostatic Vitals       None            Appearance: awake, alert and adequately groomed  Attitude:  cooperative  Eye Contact:  good  Mood:   \"good\" improving, some anxiety 2/2 withdrawal   Affect:  mood congruent and intensity is blunted  Speech:  clear, coherent and normal prosody  Language: fluent and intact in English  Psychomotor, Gait, Musculoskeletal:  no evidence of tardive dyskinesia, dystonia, or tics  Thought Process:  logical, linear, and goal oriented  Associations:  no loose associations  Thought Content:  no evidence of suicidal ideation or homicidal ideation and no evidence of psychotic thought  Insight:  good  Judgement:  intact  Oriented to:  time, person, and place  Attention Span and Concentration:  intact  Recent and Remote Memory:  intact  Fund of Knowledge:  appropriate           Precautions:     Behavioral Orders   Procedures    Code 1 - Restrict to Unit    Routine Programming     As clinically indicated    Status 15     Every 15 minutes.    Withdrawal precautions          Diagnoses:      Benzodiazepine use disorder, severe, dependence (H) with withdrawal   Opioid use disorder, severe, dependence with intoxication with complication (H) and withdrawal with hx of MAT with Suboxone   Alcohol use disorder, moderate  Methamphetamine use disorder, unspecified  Nicotine dependence and withdrawal   Major Depressive Disorder, moderate  History of " eating disorder   Hx of IVDU         Assessment & Plan:   Assessment and hospital summary:  33-year-old male with history of polysubstance use, depression and eating disorder who presented to the ED seeking detox from benzodiazepines and opiates.  Also reported drinking alcohol recently.  Medically cleared in ED.  Admitted to 3A as a voluntary patient.  MSSA with phenobarbital started for benzodiazepine withdrawal, reported using 4-6mg of Xanax daily for past 2 months. COWS with suboxone started for opiate withdrawal, has been using Fentanyl almost daily for past 2 months as well. Denies hx of seizures or DTs. Reporting depression and anxiety sympotms, denying safety concerns including SI and HI. PTA fluoxetine continued for mood. IM consult not ordered over weekend, placed order today. Pt reports goals for hospitalization are to detox safely and then discharge to residential CD treatment. Suboxone dose being increased to further target withdrawal and cravings. Appears patients insurance coverage barrier for discharge, see CM note.     Psychiatric treatment/interventions:  Medications:   -Continue MSSA with phenobarbital, will start taper today, will receive 32.4mg BID today and then 32.4mg daily tomorrow and then end  -Continue COWS with Suboxone, will increase Suboxone dose to include 4 mg at bedtime, will continue 8mg in AM and 4mg in afternoon  -Continue PTA fluoxetine 40 mg daily for mood  -Continue nicotine replacement including daily patch, educate patient on benefits of cessation  -Continue other PRN medications without changes       The risks, benefits, alternatives and side effects have been discussed and are understood by the patient.     Laboratory/Imaging: Reviewed additional labs: HgbA1c WNL; TSH WNL; GGT WNL; lipid panel with Triglycerides 154(H) otherwise WNL; HIV nonreactive; Hep C nonreactive; Chlamydia still in process     Patient will be treated in therapeutic milieu with appropriate individual  and group therapies as described.     Medical treatment/interventions:  Medical concerns:   1) Polysubstance use including benzodiazepines, alcohol and opiates with withdrawal   -continue MSSA as above with phenobarbital  -continue COWS as above with suboxone  -continue PRN comfort medications as ordered    2) IM consult placed for medical concerns, appreciate assistance, per consult note 10/23:   Max H Bosworth is a 33 year old male admitted on 10/20/2023. He has a history of polysubstance use disorder who was admitted to station 3 a for polysubstance detox including opioids, benzodiazepines, alcohol, and methamphetamines.     Polysubstance abuse (opiates, benzodiazepine, alcohol, methamphetamine)  Patient reports longstanding use of opioids, primarily fentanyl.  He also endorses benzodiazepine use daily for the last 2 months, estimates 4 to 6 mg of Xanax daily.  He endorses methamphetamine use several times per week.  She also endorses alcohol use daily with approximately 6 beers per day for the last 2 months.  Denies history of alcohol withdrawal seizures, benzo withdrawal seizures, or DTs.  -Management per psychiatry  -Agree with Suboxone and phenobarbital  -Dicyclomine and simethicone as needed for GI distress     Depression  -Continue PTA fluoxetine     Health maintenance   Patient reports history of IV drug use.  Last use greater than 1 year ago.  Reports history of sharing and reusing needles.  Denies history of hepatitis C or HIV.  Patient request STI testing.  -Check hepatitis C and HIV  -Urine gonorrhea/chlamydia           The patient's care was discussed with the Patient.     Medicine will continue to follow-up hepatitis C, HIV, gonorrhea, chlamydia testing in a.m.  Please call with additional questions or concerns.        Clinically Significant Risk Factors                                         BENJAMÍN Aguilar  Hospitalist Service          Disposition Plan   Reason for ongoing admission: requires  detoxification from substance that poses a risk of bodily harm during withdrawal period  Discharge location: [facility] dependency treatment facility, pt may not have insurance in place, does have safe place to discharge per CM once OOD  Discharge Medications: not ordered  Follow-up Appointments: not scheduled  Legal Status: voluntary     This document is created with the help of Dragon dictation system.  All grammatical/typing errors or context distortion are unintentional and inherent to software.    Patient has been seen and evaluated by Cat webb DO.

## 2023-10-25 PROCEDURE — 128N000004 HC R&B CD ADULT

## 2023-10-25 PROCEDURE — 250N000013 HC RX MED GY IP 250 OP 250 PS 637: Performed by: CLINICAL NURSE SPECIALIST

## 2023-10-25 PROCEDURE — 250N000013 HC RX MED GY IP 250 OP 250 PS 637: Performed by: PSYCHIATRY & NEUROLOGY

## 2023-10-25 PROCEDURE — 99232 SBSQ HOSP IP/OBS MODERATE 35: CPT | Performed by: PSYCHIATRY & NEUROLOGY

## 2023-10-25 RX ORDER — PHENOBARBITAL 32.4 MG/1
32.4 TABLET ORAL DAILY
Qty: 2 TABLET | Refills: 0 | Status: DISCONTINUED | OUTPATIENT
Start: 2023-10-26 | End: 2023-10-27

## 2023-10-25 RX ADMIN — BUPRENORPHINE AND NALOXONE 1 FILM: 8; 2 FILM, SOLUBLE BUCCAL; SUBLINGUAL at 09:12

## 2023-10-25 RX ADMIN — NICOTINE 1 PATCH: 14 PATCH, EXTENDED RELEASE TRANSDERMAL at 09:13

## 2023-10-25 RX ADMIN — BUPRENORPHINE AND NALOXONE 1 FILM: 4; 1 FILM, SOLUBLE BUCCAL; SUBLINGUAL at 16:14

## 2023-10-25 RX ADMIN — FLUOXETINE 40 MG: 20 CAPSULE ORAL at 09:11

## 2023-10-25 RX ADMIN — PHENOBARBITAL 32.4 MG: 32.4 TABLET ORAL at 09:11

## 2023-10-25 RX ADMIN — NICOTINE POLACRILEX 4 MG: 4 GUM, CHEWING BUCCAL at 12:55

## 2023-10-25 RX ADMIN — BUPRENORPHINE AND NALOXONE 1 FILM: 4; 1 FILM, SOLUBLE BUCCAL; SUBLINGUAL at 20:14

## 2023-10-25 ASSESSMENT — ACTIVITIES OF DAILY LIVING (ADL)
ADLS_ACUITY_SCORE: 29
LAUNDRY: WITH SUPERVISION
ADLS_ACUITY_SCORE: 29
ADLS_ACUITY_SCORE: 29
HYGIENE/GROOMING: INDEPENDENT
ADLS_ACUITY_SCORE: 29
ADLS_ACUITY_SCORE: 29
DRESS: INDEPENDENT
ADLS_ACUITY_SCORE: 29

## 2023-10-25 NOTE — PLAN OF CARE
Problem: Adult Inpatient Plan of Care  Goal: Optimal Comfort and Wellbeing  Intervention: Provide Person-Centered Care  Recent Flowsheet Documentation  Taken 10/25/2023 1149 by Louise Navarrete, RN  Trust Relationship/Rapport: care explained   Goal Outcome Evaluation:    Plan of Care Reviewed With: patient        Pleasant, social and med compliant, continues on  Suboxone  maintenance and Phenobarbital  taper, awaiting insurance to go through for treatment, proposed date Nov 1st, pt declined to crisis bed. No pain, MSSA 3/3, COWS 3/1. No psych symptoms reported. Engaged and active on the unit.

## 2023-10-25 NOTE — PROGRESS NOTES
[facility], [place]   Psychiatric Progress Note  Hospital Day: 4        Interim History:   The patient's care was discussed with the treatment team during the daily team meeting and/or staff's chart notes were reviewed.    Patient reports his mood is better and feels his withdrawal symptoms are getting better and he continues on Suboxone 16 mg in divided doses    He denies any suicide ideation plan or intent he denied any auditory or visual hallucinations  He sleep is better energy is better motivation is better appetite is better         Medications:      buprenorphine HCl-naloxone HCl  1 Film Sublingual BID    buprenorphine HCl-naloxone HCl  1 Film Sublingual Daily    FLUoxetine  40 mg Oral Daily    nicotine  1 patch Transdermal Daily    nicotine   Transdermal Q8H    PHENobarbital  32.4 mg Oral Daily    PHENobarbital  32.4 mg Oral BID          Allergies:     Allergies   Allergen Reactions    Penicillins Hives    Wellbutrin [Bupropion] Hives          Labs:     Recent Results (from the past 48 hour(s))   Chlamydia trachomatis/Neisseria gonorrhoeae by PCR    Collection Time: 10/23/23  2:14 PM    Specimen: Urine, Voided   Result Value Ref Range    Chlamydia Trachomatis Negative Negative    Neisseria gonorrhoeae Negative Negative   Hepatitis C Screen Reflex to HCV RNA Quant and Genotype    Collection Time: 10/23/23  5:24 PM   Result Value Ref Range    Hepatitis C Antibody Nonreactive Nonreactive   HIV Antigen Antibody Combo Cascade    Collection Time: 10/23/23  5:24 PM   Result Value Ref Range    HIV Antigen Antibody Combo Nonreactive Nonreactive   Lipid panel reflex to direct LDL    Collection Time: 10/24/23  6:51 AM   Result Value Ref Range    Cholesterol 127 <200 mg/dL    Triglycerides 154 (H) <150 mg/dL    Direct Measure HDL 47 >=40 mg/dL    LDL Cholesterol Calculated 49 <=100 mg/dL    Non HDL Cholesterol 80 <130 mg/dL          Psychiatric Examination:     /78   Pulse 79   Temp  "97.6  F (36.4  C) (Temporal)   Resp 16   Ht 1.829 m (6')   Wt 74.8 kg (165 lb)   SpO2 100%   BMI 22.38 kg/m    Weight is 165 lbs 0 oz  Body mass index is 22.38 kg/m .    Orthostatic Vitals       None            Appearance: awake, alert and adequately groomed  Attitude:  cooperative  Eye Contact:  good  Mood:   \"good\" improving, some anxiety 2/2 withdrawal   Affect:  mood congruent and intensity is blunted  Speech:  clear, coherent and normal prosody  Language: fluent and intact in English  Psychomotor, Gait, Musculoskeletal:  no evidence of tardive dyskinesia, dystonia, or tics  Thought Process:  logical, linear, and goal oriented  Associations:  no loose associations  Thought Content:  no evidence of suicidal ideation or homicidal ideation and no evidence of psychotic thought  Insight:  good  Judgement:  intact  Oriented to:  time, person, and place  Attention Span and Concentration:  intact  Recent and Remote Memory:  intact  Fund of Knowledge:  appropriate           Precautions:     Behavioral Orders   Procedures    Code 1 - Restrict to Unit    Routine Programming     As clinically indicated    Status 15     Every 15 minutes.    Withdrawal precautions          Diagnoses:      Benzodiazepine use disorder, severe, dependence (H) with withdrawal   Opioid use disorder, severe, dependence with intoxication with complication (H) and withdrawal with hx of MAT with Suboxone   Alcohol use disorder, moderate  Methamphetamine use disorder, unspecified  Nicotine dependence and withdrawal   Major Depressive Disorder, moderate  History of eating disorder   Hx of IVDU         Assessment & Plan:   Assessment and hospital summary:  33-year-old male with history of polysubstance use, depression and eating disorder who presented to the ED seeking detox from benzodiazepines and opiates.  Also reported drinking alcohol recently.  Medically cleared in ED.  Admitted to  as a voluntary patient.  MSSA with phenobarbital started " for benzodiazepine withdrawal, reported using 4-6mg of Xanax daily for past 2 months. COWS with suboxone started for opiate withdrawal, has been using Fentanyl almost daily for past 2 months as well. Denies hx of seizures or DTs. Reporting depression and anxiety sympotms, denying safety concerns including SI and HI. PTA fluoxetine continued for mood. IM consult not ordered over weekend, placed order today. Pt reports goals for hospitalization are to detox safely and then discharge to residential CD treatment. Suboxone dose being increased to further target withdrawal and cravings. Appears patients insurance coverage barrier for discharge, see CM note.     Psychiatric treatment/interventions:  Medications:   -Continue MSSA with phenobarbital, will start taper today, will receive 32.4mg BID today and then 32.4mg daily tomorrow and then end  -Continue COWS with Suboxone, will increase Suboxone dose to include 4 mg at bedtime, will continue 8mg in AM and 4mg in afternoon  -Continue PTA fluoxetine 40 mg daily for mood  -Continue nicotine replacement including daily patch, educate patient on benefits of cessation  -Continue other PRN medications without changes       The risks, benefits, alternatives and side effects have been discussed and are understood by the patient.     Laboratory/Imaging: Reviewed additional labs: HgbA1c WNL; TSH WNL; GGT WNL; lipid panel with Triglycerides 154(H) otherwise WNL; HIV nonreactive; Hep C nonreactive; Chlamydia still in process     Patient will be treated in therapeutic milieu with appropriate individual and group therapies as described.     Medical treatment/interventions:  Medical concerns:   1) Polysubstance use including benzodiazepines, alcohol and opiates with withdrawal   -continue MSSA as above with phenobarbital  -continue COWS as above with suboxone  -continue PRN comfort medications as ordered    2) IM consult placed for medical concerns, appreciate assistance, per consult  note 10/23:   Max H Bosworth is a 33 year old male admitted on 10/20/2023. He has a history of polysubstance use disorder who was admitted to station 3 a for polysubstance detox including opioids, benzodiazepines, alcohol, and methamphetamines.     Polysubstance abuse (opiates, benzodiazepine, alcohol, methamphetamine)  Patient reports longstanding use of opioids, primarily fentanyl.  He also endorses benzodiazepine use daily for the last 2 months, estimates 4 to 6 mg of Xanax daily.  He endorses methamphetamine use several times per week.  She also endorses alcohol use daily with approximately 6 beers per day for the last 2 months.  Denies history of alcohol withdrawal seizures, benzo withdrawal seizures, or DTs.  -Management per psychiatry  -Agree with Suboxone and phenobarbital  -Dicyclomine and simethicone as needed for GI distress     Depression  -Continue PTA fluoxetine     Health maintenance   Patient reports history of IV drug use.  Last use greater than 1 year ago.  Reports history of sharing and reusing needles.  Denies history of hepatitis C or HIV.  Patient request STI testing.  -Check hepatitis C and HIV  -Urine gonorrhea/chlamydia           The patient's care was discussed with the Patient.     Medicine will continue to follow-up hepatitis C, HIV, gonorrhea, chlamydia testing in a.m.  Please call with additional questions or concerns.        Clinically Significant Risk Factors                                         BENJAMÍN Aguilar  Hospitalist Service          Disposition Plan   Reason for ongoing admission: requires detoxification from substance that poses a risk of bodily harm during withdrawal period  Discharge location: Chemical dependency treatment facility, pt may not have insurance in place, does have safe place to discharge per CM once OOD  Discharge Medications: not ordered  Follow-up Appointments: not scheduled  Legal Status: voluntary     This document is created with the help of  Dragon dictation system.  All grammatical/typing errors or context distortion are unintentional and inherent to software.

## 2023-10-25 NOTE — PLAN OF CARE
Problem: Sleep Disturbance  Goal: Adequate Sleep/Rest  Outcome: Progressing   Goal Outcome Evaluation:  Patient awake very briefly x1, otherwise is observed to sleep throughout the noc.

## 2023-10-25 NOTE — PLAN OF CARE
Problem: Adult Behavioral Health Plan of Care  Goal: Plan of Care Review  Outcome: Progressing  Flowsheets (Taken 10/24/2023 1617)  Patient Agreement with Plan of Care: agrees     Problem: Substance Withdrawal  Goal: Substance Withdrawal  Description: Signs and symptoms of listed problems will be absent or manageable.  Outcome: Progressing   Goal Outcome Evaluation:    Plan of Care Reviewed With: patient        The patient was visible throughout the evening until bedtime and present in the group activity. Although the patient actively participated and socialized with others, he reported feeling anxious. However, there were no signs of depression, poor appetite, constipation, or poor sleep patterns, and he had no suicidal or homicidal thoughts. The patient's mood was calm, but he appeared bright when conversing with the team. As per the Modified Selective Severity Assessment (MSSA), the patient scored 2 and 3, and the Clinical Opiate Withdrawal Scale (COWS) was 1 and 1. The patient was compliant with the medication and had no adverse reactions.

## 2023-10-25 NOTE — PROGRESS NOTES
Met with patient to discuss discharge and aftercare planning. Patient reports he is still waiting for his insurance to become active and was told it should be active on the 1st of November. He was hoping to transfer to [facility] however writer notified him that he has to have insurance in place in order to receive services. Patient is reporting he may have somewhere to stay but is not 100% sure. We discussed crisis beds and he declined. At this time it does not appear the patient will have anywhere to go upon discharge from detox if he does not agree to a crisis bed.

## 2023-10-26 PROCEDURE — 99232 SBSQ HOSP IP/OBS MODERATE 35: CPT | Performed by: PSYCHIATRY & NEUROLOGY

## 2023-10-26 PROCEDURE — 250N000013 HC RX MED GY IP 250 OP 250 PS 637: Performed by: PSYCHIATRY & NEUROLOGY

## 2023-10-26 PROCEDURE — 128N000004 HC R&B CD ADULT

## 2023-10-26 PROCEDURE — 250N000013 HC RX MED GY IP 250 OP 250 PS 637: Performed by: CLINICAL NURSE SPECIALIST

## 2023-10-26 RX ADMIN — PHENOBARBITAL 32.4 MG: 32.4 TABLET ORAL at 07:53

## 2023-10-26 RX ADMIN — BUPRENORPHINE AND NALOXONE 1 FILM: 8; 2 FILM, SOLUBLE BUCCAL; SUBLINGUAL at 07:53

## 2023-10-26 RX ADMIN — FLUOXETINE 40 MG: 20 CAPSULE ORAL at 07:52

## 2023-10-26 RX ADMIN — BUPRENORPHINE AND NALOXONE 1 FILM: 4; 1 FILM, SOLUBLE BUCCAL; SUBLINGUAL at 16:18

## 2023-10-26 RX ADMIN — BUPRENORPHINE AND NALOXONE 1 FILM: 4; 1 FILM, SOLUBLE BUCCAL; SUBLINGUAL at 20:10

## 2023-10-26 RX ADMIN — NICOTINE 1 PATCH: 14 PATCH, EXTENDED RELEASE TRANSDERMAL at 07:53

## 2023-10-26 ASSESSMENT — ACTIVITIES OF DAILY LIVING (ADL)
ADLS_ACUITY_SCORE: 29
ORAL_HYGIENE: INDEPENDENT
ADLS_ACUITY_SCORE: 29
DRESS: INDEPENDENT
ADLS_ACUITY_SCORE: 29
ADLS_ACUITY_SCORE: 29
HYGIENE/GROOMING: INDEPENDENT
HYGIENE/GROOMING: INDEPENDENT
ORAL_HYGIENE: INDEPENDENT
ADLS_ACUITY_SCORE: 29
DRESS: INDEPENDENT

## 2023-10-26 NOTE — PROGRESS NOTES
Met with patient about discharge planning. Patient reported he still has not been able to get active insurance through his friend however she did tell him it would be active on the 1st of November. He believes he can stay with this friend until his insurance is active. He was encouraged to reach out to his friend to see if he is able to stay with her while he waits for his insurance to become active.

## 2023-10-26 NOTE — PLAN OF CARE
Problem: Sleep Disturbance  Goal: Adequate Sleep/Rest  Outcome: Progressing   Goal Outcome Evaluation:  Patient is out of detox monitoring status for alcohol withdrawal and is observed to sleep throughout the noc,

## 2023-10-26 NOTE — PLAN OF CARE
Problem: Adult Inpatient Plan of Care  Goal: Plan of Care Review  Flowsheets (Taken 10/26/2023 5475)  Plan of Care Reviewed With: patient  Goal Outcome Evaluation:    Plan of Care Reviewed With: patient Plan of Care Reviewed With: patient    Patient remains out of detox. Bright affect, pleasant and cooperative upon approach;  eating 100 % of meals and hydrating adequately;  Pt denied SI, SIB, HI, and hallucinations;  Attending unit programmingy; no behavioral escalation or concerns noted this shift.   VSS  ./72 (BP Location: Left arm)   Pulse 66   Temp 97.8  F (36.6  C) (Oral)   Resp 16    SpO2 98%

## 2023-10-26 NOTE — PROGRESS NOTES
"[facility], [place]   Psychiatric Progress Note  Hospital Day: 5        Interim History:   The patient's care was discussed with the treatment team during the daily team meeting and/or staff's chart notes were reviewed.    Patient reports his mood is better and feels his withdrawal symptoms are getting better and he continues on Suboxone 16 mg in divided doses    He denies any suicide ideation plan or intent he denied any auditory or visual hallucinations  He sleep is better energy is better motivation is better appetite is better         Medications:      buprenorphine HCl-naloxone HCl  1 Film Sublingual BID    buprenorphine HCl-naloxone HCl  1 Film Sublingual Daily    FLUoxetine  40 mg Oral Daily    nicotine  1 patch Transdermal Daily    nicotine   Transdermal Q8H    PHENobarbital  32.4 mg Oral Daily          Allergies:     Allergies   Allergen Reactions    Penicillins Hives    Wellbutrin [Bupropion] Hives          Labs:     No results found for this or any previous visit (from the past 48 hour(s)).         Psychiatric Examination:     /72 (BP Location: Left arm)   Pulse 71   Temp 97.6  F (36.4  C) (Tympanic)   Resp 16   Ht 1.829 m (6')   Wt 74.8 kg (165 lb)   SpO2 98%   BMI 22.38 kg/m    Weight is 165 lbs 0 oz  Body mass index is 22.38 kg/m .    Orthostatic Vitals       None            Appearance: awake, alert and adequately groomed  Attitude:  cooperative  Eye Contact:  good  Mood:   \"good\" improving, some anxiety 2/2 withdrawal   Affect:  mood congruent and intensity is blunted  Speech:  clear, coherent and normal prosody  Language: fluent and intact in English  Psychomotor, Gait, Musculoskeletal:  no evidence of tardive dyskinesia, dystonia, or tics  Thought Process:  logical, linear, and goal oriented  Associations:  no loose associations  Thought Content:  no evidence of suicidal ideation or homicidal ideation and no evidence of psychotic thought  Insight:  " good  Judgement:  intact  Oriented to:  time, person, and place  Attention Span and Concentration:  intact  Recent and Remote Memory:  intact  Fund of Knowledge:  appropriate           Precautions:     Behavioral Orders   Procedures    Code 1 - Restrict to Unit    Routine Programming     As clinically indicated    Status 15     Every 15 minutes.    Withdrawal precautions          Diagnoses:      Benzodiazepine use disorder, severe, dependence (H) with withdrawal   Opioid use disorder, severe, dependence with intoxication with complication (H) and withdrawal with hx of MAT with Suboxone   Alcohol use disorder, moderate  Methamphetamine use disorder, unspecified  Nicotine dependence and withdrawal   Major Depressive Disorder, moderate  History of eating disorder   Hx of IVDU         Assessment & Plan:   Assessment and hospital summary:  33-year-old male with history of polysubstance use, depression and eating disorder who presented to the ED seeking detox from benzodiazepines and opiates.  Also reported drinking alcohol recently.  Medically cleared in ED.  Admitted to 3A as a voluntary patient.  MSSA with phenobarbital started for benzodiazepine withdrawal, reported using 4-6mg of Xanax daily for past 2 months. COWS with suboxone started for opiate withdrawal, has been using Fentanyl almost daily for past 2 months as well. Denies hx of seizures or DTs. Reporting depression and anxiety sympotms, denying safety concerns including SI and HI. PTA fluoxetine continued for mood. IM consult not ordered over weekend, placed order today. Pt reports goals for hospitalization are to detox safely and then discharge to residential CD treatment. Suboxone dose being increased to further target withdrawal and cravings. Appears patients insurance coverage barrier for discharge, see CM note.     Psychiatric treatment/interventions:  Medications: \  Continue phenbarbital taper   -Continue COWS with Suboxone, will increase Suboxone dose  to include 4 mg at bedtime, will continue 8mg in AM and 4mg in afternoon  -Continue PTA fluoxetine 40 mg daily for mood  -Continue nicotine replacement including daily patch, educate patient on benefits of cessation  -Continue other PRN medications without changes       The risks, benefits, alternatives and side effects have been discussed and are understood by the patient.     Laboratory/Imaging: Reviewed additional labs: HgbA1c WNL; TSH WNL; GGT WNL; lipid panel with Triglycerides 154(H) otherwise WNL; HIV nonreactive; Hep C nonreactive; Chlamydia still in process     Patient will be treated in therapeutic milieu with appropriate individual and group therapies as described.     Medical treatment/interventions:  Medical concerns:   1) Polysubstance use including benzodiazepines, alcohol and opiates with withdrawal   -continue MSSA as above with phenobarbital  -continue COWS as above with suboxone  -continue PRN comfort medications as ordered    2) IM consult placed for medical concerns, appreciate assistance, per consult note 10/23:   Max H Bosworth is a 33 year old male admitted on 10/20/2023. He has a history of polysubstance use disorder who was admitted to station 3 a for polysubstance detox including opioids, benzodiazepines, alcohol, and methamphetamines.     Polysubstance abuse (opiates, benzodiazepine, alcohol, methamphetamine)  Patient reports longstanding use of opioids, primarily fentanyl.  He also endorses benzodiazepine use daily for the last 2 months, estimates 4 to 6 mg of Xanax daily.  He endorses methamphetamine use several times per week.  She also endorses alcohol use daily with approximately 6 beers per day for the last 2 months.  Denies history of alcohol withdrawal seizures, benzo withdrawal seizures, or DTs.  -Management per psychiatry  -Agree with Suboxone and phenobarbital  -Dicyclomine and simethicone as needed for GI distress     Depression  -Continue PTA fluoxetine     Health maintenance    Patient reports history of IV drug use.  Last use greater than 1 year ago.  Reports history of sharing and reusing needles.  Denies history of hepatitis C or HIV.  Patient request STI testing.  -Check hepatitis C and HIV  -Urine gonorrhea/chlamydia           The patient's care was discussed with the Patient.     Medicine will continue to follow-up hepatitis C, HIV, gonorrhea, chlamydia testing in a.m.  Please call with additional questions or concerns.        Clinically Significant Risk Factors                                         BENJAMÍN Aguilar  Hospitalist Service          Disposition Plan   Reason for ongoing admission: requires detoxification from substance that poses a risk of bodily harm during withdrawal period  Discharge location: Chemical dependency treatment facility, pt may not have insurance in place, does have safe place to discharge per CM once OOD  Discharge Medications: not ordered  Follow-up Appointments: not scheduled  Legal Status: voluntary     This document is created with the help of Dragon dictation system.  All grammatical/typing errors or context distortion are unintentional and inherent to software.

## 2023-10-26 NOTE — PLAN OF CARE
"Goal Outcome Evaluation:    Plan of Care Reviewed With: patient      Problem: Adult Behavioral Health Plan of Care  Goal: Plan of Care Review  Outcome: Progressing  Flowsheets (Taken 10/26/2023 1031)  Patient Agreement with Plan of Care: agrees    Pt has been visible in milieu socializing with peers and staff with bright affect and calm mood, he was alert and oriented x4, cooperative with cares and med administration. Pt is OOD, no withdrawn symptoms noted this shift, VSS. Pt endorsed anxiety related to discharge, potential discharge Thursday or Friday this week. He denied c/o depression, hallucinations, SI/SIB/HI, described mood as \"okay.\"Pt is open to outpatient treatment in [place] before moving back to [place]. Pt attended spirituality group. See Jv white note for discharge planning      Vital signs:  Temp: 98.2  F (36.8  C) Temp src: Oral BP: 105/65 Pulse: 72   Resp: 16 SpO2: 98 % O2 Device: None (Room air)   Height: 182.9 cm (6') Weight: 74.8 kg (165 lb)                    "

## 2023-10-26 NOTE — PROGRESS NOTES
"Behavioral Health  Note    Behavioral Health  Spirituality Group Note    UNIT 3AW    Name:    Max H Bosworth                                                                YOB: 1990    MRN:     [id number]                                                                       Age: 33 year old      Patient attended -led group, which included discussion of spirituality, coping with illness and building resilience. Today's topic was The Heart/Spiritual Assessment.    Patient attended group for [facility] - spirituality groups are not billed.    The patient actively participated in group discussion and patient demonstrated an appreciation of topic's application for their personal circumstances.    Zhen shared that he feels fully alive when he's at the beach at his home in [place] at sunset, with his fiance and dog. After completing the spiritual self-assessment, what surprised him is who he needs to seek forgiveness from (his family, and his fiance's family). Zhen identified that when he's \"active in addiction\" he is \"resentful and nasty.\" He acknowledged that he's damaged relationships and expressed a desire to make things right. We discussed the difference between forgiveness and the process of re-building trust/restoring relationships.      Belinda Dominique MA  Associate   Pager: [phone]    "

## 2023-10-27 VITALS
BODY MASS INDEX: 22.35 KG/M2 | RESPIRATION RATE: 16 BRPM | TEMPERATURE: 97.7 F | OXYGEN SATURATION: 98 % | WEIGHT: 165 LBS | DIASTOLIC BLOOD PRESSURE: 73 MMHG | SYSTOLIC BLOOD PRESSURE: 116 MMHG | HEART RATE: 74 BPM | HEIGHT: 72 IN

## 2023-10-27 PROCEDURE — 250N000013 HC RX MED GY IP 250 OP 250 PS 637: Performed by: PSYCHIATRY & NEUROLOGY

## 2023-10-27 PROCEDURE — 99239 HOSP IP/OBS DSCHRG MGMT >30: CPT | Performed by: PSYCHIATRY & NEUROLOGY

## 2023-10-27 PROCEDURE — 250N000013 HC RX MED GY IP 250 OP 250 PS 637: Performed by: CLINICAL NURSE SPECIALIST

## 2023-10-27 RX ORDER — BUPRENORPHINE AND NALOXONE 8; 2 MG/1; MG/1
1 FILM, SOLUBLE BUCCAL; SUBLINGUAL 2 TIMES DAILY
Qty: 60 FILM | Refills: 0 | Status: SHIPPED | OUTPATIENT
Start: 2023-10-27 | End: 2023-11-26

## 2023-10-27 RX ORDER — FLUOXETINE 40 MG/1
40 CAPSULE ORAL DAILY
Qty: 30 CAPSULE | Refills: 0 | Status: SHIPPED | OUTPATIENT
Start: 2023-10-27

## 2023-10-27 RX ADMIN — FLUOXETINE 40 MG: 20 CAPSULE ORAL at 09:00

## 2023-10-27 RX ADMIN — BUPRENORPHINE AND NALOXONE 1 FILM: 8; 2 FILM, SOLUBLE BUCCAL; SUBLINGUAL at 09:02

## 2023-10-27 ASSESSMENT — ACTIVITIES OF DAILY LIVING (ADL)
ADLS_ACUITY_SCORE: 29
ORAL_HYGIENE: INDEPENDENT
HYGIENE/GROOMING: INDEPENDENT
ADLS_ACUITY_SCORE: 29
DRESS: INDEPENDENT
ADLS_ACUITY_SCORE: 29

## 2023-10-27 NOTE — PROGRESS NOTES
"SPIRITUAL HEALTH SERVICES Progress Note  [facility] 3[facility]    Encountered pt Zhen on the unit and he shared he anticipates discharge today.    We spoke for about 10 minutes in the hallway about his plan after discharge, and his desire to face/address some \"childhood\" distress that's still impacting him. He identified support/mentors and shared his plans for working on building trust with himself and others, asking for forgiveness from those he has hurt, and \"focusing on myself right now.\"  We reflected on one of his favorite movies, The Gladiator, and how it applies to his life right now. Zhen shared, \"I'm so glad I talked to you.\"    Plan: Provided reflective and empathetic listening. No plan to follow up.  remains available per pt request.    Belinda Dominique MA  Associate   Pager [phone]      * [facility] remains available 24/7 for emergent requests/referrals, either by having the switchboard page the on-call  or by entering an ASAP/STAT consult in Epic (this will also page the on-call ). Routine Epic consults receive an initial response within 24 hours.*    "

## 2023-10-27 NOTE — DISCHARGE SUMMARY
Max H Bosworth MRN# [id number]   Age: 33 year old YOB: 1990     Date of Admission:  10/20/2023  Date of Discharge:  10/27/2023  Admitting Physician:  Cat Hazel MD  Discharge Physician:  Gladys Bennett MD      DISCHARGE  DX    Benzodiazepine use disorder, severe, dependence   Opioid use disorder, severe, dependence Alcohol use disorder, moderate  Methamphetamine use disorder, unspecified  Nicotine dependence and withdrawal   Major Depressive Disorder, moderate  History of eating disorder   Hx of IVDU         Event Leading to Hospitalization:     See Admission note by admitting provider for patient encounter. for additional details.          Hospital Course:   PATIENT was admitted to Station 3Awith attending  under DR Naegele, Debra Ann, APRN CNS veluvali, please review the detailed admit note on 10/21/23    The patient was placed under status 15 (15 minute checks) to ensure patient safety.   Patient was started on Suboxone to 16 mg  Patient was detoxed off benzos using phenobarbital and started the taper and tolerated the taper  Patient was continued on Prozac 40 mg    All outpatient medications were continued    PATIENTdid participate in groups and was visible in the milieu.     The patient's symptoms of withdrawal improved.     Patients energy motivation , sleep appetite improved.  Pt completed detox . It was un eventful.      Spoke with patient about triggers coping skills relapse prevention.    CONSULTS DONE DURING PATIENTS HOSPITALIZATION.  Patient was seen by medicine on date 10/23/2023    This as per their medical consult    Assessment & Plan  Max H Bosworth is a 33 year old male admitted on 10/20/2023. He has a history of polysubstance use disorder who was admitted to station 3 a for polysubstance detox including opioids, benzodiazepines, alcohol, and methamphetamines.     Polysubstance abuse (opiates, benzodiazepine, alcohol, methamphetamine)  Patient reports longstanding  use of opioids, primarily fentanyl.  He also endorses benzodiazepine use daily for the last 2 months, estimates 4 to 6 mg of Xanax daily.  He endorses methamphetamine use several times per week.  She also endorses alcohol use daily with approximately 6 beers per day for the last 2 months.  Denies history of alcohol withdrawal seizures, benzo withdrawal seizures, or DTs.  -Management per psychiatry  -Agree with Suboxone and phenobarbital  -Dicyclomine and simethicone as needed for GI distress     Depression  -Continue PTA fluoxetine     Health maintenance   Patient reports history of IV drug use.  Last use greater than 1 year ago.  Reports history of sharing and reusing needles.  Denies history of hepatitis C or HIV.  Patient request STI testing.  -Check hepatitis C and HIV  -Urine gonorrhea/chlamydia           The patient's care was discussed with the Patient.     Medicine will continue to follow-up hepatitis C, HIV, gonorrhea, chlamydia testing in a.m.  Please call with additional questions or concerns.                       Labs:reviewed with patient     No results found for this or any previous visit (from the past 48 hour(s)).      Recent Results (from the past 240 hour(s))   Comprehensive metabolic panel    Collection Time: 10/20/23 11:13 PM   Result Value Ref Range    Sodium 136 135 - 145 mmol/L    Potassium 4.0 3.4 - 5.3 mmol/L    Carbon Dioxide (CO2) 27 22 - 29 mmol/L    Anion Gap 9 7 - 15 mmol/L    Urea Nitrogen 22.4 (H) 6.0 - 20.0 mg/dL    Creatinine 0.89 0.67 - 1.17 mg/dL    GFR Estimate >90 >60 mL/min/1.73m2    Calcium 8.9 8.6 - 10.0 mg/dL    Chloride 100 98 - 107 mmol/L    Glucose 124 (H) 70 - 99 mg/dL    Alkaline Phosphatase 72 40 - 129 U/L    AST 42 0 - 45 U/L    ALT 38 0 - 70 U/L    Protein Total 6.4 6.4 - 8.3 g/dL    Albumin 4.5 3.5 - 5.2 g/dL    Bilirubin Total 0.2 <=1.2 mg/dL   Ethyl Alcohol Level    Collection Time: 10/20/23 11:13 PM   Result Value Ref Range    Alcohol ethyl <0.01 <=0.01 g/dL    CBC with platelets and differential    Collection Time: 10/20/23 11:13 PM   Result Value Ref Range    WBC Count 5.3 4.0 - 11.0 10e3/uL    RBC Count 3.87 (L) 4.40 - 5.90 10e6/uL    Hemoglobin 12.4 (L) 13.3 - 17.7 g/dL    Hematocrit 37.1 (L) 40.0 - 53.0 %    MCV 96 78 - 100 fL    MCH 32.0 26.5 - 33.0 pg    MCHC 33.4 31.5 - 36.5 g/dL    RDW 11.9 10.0 - 15.0 %    Platelet Count 230 150 - 450 10e3/uL    % Neutrophils 29 %    % Lymphocytes 55 %    % Monocytes 12 %    Mids % (Monos, Eos, Basos)      % Eosinophils 3 %    % Basophils 1 %    % Immature Granulocytes 0 %    NRBCs per 100 WBC 0 <1 /100    Absolute Neutrophils 1.5 (L) 1.6 - 8.3 10e3/uL    Absolute Lymphocytes 2.9 0.8 - 5.3 10e3/uL    Absolute Monocytes 0.6 0.0 - 1.3 10e3/uL    Mids Abs (Monos, Eos, Basos)      Absolute Eosinophils 0.2 0.0 - 0.7 10e3/uL    Absolute Basophils 0.0 0.0 - 0.2 10e3/uL    Absolute Immature Granulocytes 0.0 <=0.4 10e3/uL    Absolute NRBCs 0.0 10e3/uL   Hemoglobin A1c    Collection Time: 10/20/23 11:13 PM   Result Value Ref Range    Hemoglobin A1C 5.3 <5.7 %   TSH with free T4 reflex    Collection Time: 10/20/23 11:13 PM   Result Value Ref Range    TSH 2.03 0.30 - 4.20 uIU/mL   GGT    Collection Time: 10/20/23 11:13 PM   Result Value Ref Range    GGT 11 8 - 61 U/L   Urine Drug Screen Panel    Collection Time: 10/21/23  8:55 AM   Result Value Ref Range    Amphetamines Urine Screen Positive (A) Screen Negative    Barbituates Urine Screen Negative Screen Negative    Benzodiazepine Urine Screen Negative Screen Negative    Cannabinoids Urine Screen Negative Screen Negative    Cocaine Urine Screen Negative Screen Negative    Fentanyl Qual Urine Screen Positive (A) Screen Negative    Opiates Urine Screen Negative Screen Negative    PCP Urine Screen Negative Screen Negative   Chlamydia trachomatis/Neisseria gonorrhoeae by PCR    Collection Time: 10/23/23  2:14 PM    Specimen: Urine, Voided   Result Value Ref Range    Chlamydia Trachomatis  Negative Negative    Neisseria gonorrhoeae Negative Negative   Hepatitis C Screen Reflex to HCV RNA Quant and Genotype    Collection Time: 10/23/23  5:24 PM   Result Value Ref Range    Hepatitis C Antibody Nonreactive Nonreactive   HIV Antigen Antibody Combo Cascade    Collection Time: 10/23/23  5:24 PM   Result Value Ref Range    HIV Antigen Antibody Combo Nonreactive Nonreactive   Lipid panel reflex to direct LDL    Collection Time: 10/24/23  6:51 AM   Result Value Ref Range    Cholesterol 127 <200 mg/dL    Triglycerides 154 (H) <150 mg/dL    Direct Measure HDL 47 >=40 mg/dL    LDL Cholesterol Calculated 49 <=100 mg/dL    Non HDL Cholesterol 80 <130 mg/dL            Because this patient meets criteria for an Alcohol Use Disorder, I performed the following brief intervention on the date of this note:              1) Expressed concern that the patient is drinking at unhealthy levels known to increase their risk of alcohol related problems              2) Gave feedback linking alcohol use and health, including personalized feedback explaining how alcohol use can interact with their medical and/or psychiatric problems, and with prescribed medications.              3) Advised patient to abstain.    PT counseled on nicotine cessation and nicotine replacement provided    Counseled the patient on the importance of having a recovery program in addition to medication to manage recovery.  Components include avoiding isolating, having willingness to change, avoiding triggers and managing cravings. Encouraged having some type of sober network and practicing honesty with trusted support person(s).     Discussed with patient many issues of addiction,triggers, relapse, and establishing a solid recovery program.    DISCHARGE MENTAL STATUS EXAMINATION:  The patient is alert, oriented x3.  Good fund of knowledge.  Good use of language.  Recent and remote memory, language, fund of knowledge are all adequate.  Euthymic mood congruent  affect  Speech normal rate/rhythm linear tp no loose asso,The patient does not have any active suicidal or homicidal ideation.  Does not have any auditory or visual hallucination.  Fair insight/judgment At this time, the patient was stable to be discharged.        Pt was not determined to not be a danger to himself or others. At the current time of discharge, the patient does not meet criteria for involuntary hospitalization. On the day of discharge, the patient reports that they do not have suicidal or homicidal ideation and would never hurt themselves or others. Steps taken to minimize risk include: assessing patient s behavior and thought process daily during hospital stay, discharging patient with adequate plan for follow up for mental and physical health and discussing safety plan of returning to the hospital should the patient ever have thoughts of harming themselves or others. Therefore, based on all available evidence including the factors cited above, the patient does not appear to be at imminent risk for self-harm, and is appropriate for outpatient level of care.     Educated about side effects/risk vs benefits /alternative including non treatment.Pt consented to be on medication.     .Total time spent on discharge summary more than 35 min  More than  20 min  planning, coordination of care, medication reconciliation and performance of physical exam on day of discharge.Care was coordinated with unit RN and unit therapist         Review of your medicines        CONTINUE these medicines which may have CHANGED, or have new prescriptions. If we are uncertain of the size of tablets/capsules you have at home, strength may be listed as something that might have changed.        Dose / Directions   buprenorphine HCl-naloxone HCl 8-2 MG per film  Commonly known as: SUBOXONE  This may have changed: when to take this  Used for: Opioid dependence with intoxication with complication (H)      Dose: 1 Film  Place 1 Film  "under the tongue 2 times daily for 30 days  Quantity: 60 Film  Refills: 0            CONTINUE these medicines which have NOT CHANGED        Dose / Directions   FLUoxetine 40 MG capsule  Commonly known as: PROzac  Used for: Opioid dependence with intoxication with complication (H)      Dose: 40 mg  Take 1 capsule (40 mg) by mouth daily  Quantity: 30 capsule  Refills: 0               Where to get your medicines        These medications were sent to [facility] [address]  [address] [address]      Phone: [phone]   FLUoxetine 40 MG capsule       Some of these will need a paper prescription and others can be bought over the counter. Ask your nurse if you have questions.    Bring a paper prescription for each of these medications  buprenorphine HCl-naloxone HCl 8-2 MG per film          Disposition: [address]     Facts about COVID19 at www.cdc.gov/COVID19 and www.[place].gov/covid19     Keeping hands clean is one of the most important steps we can take to avoid getting sick and spreading germs to others.  Please wash your hands frequently and lather with soap for at least 20 seconds!     Medical Follow-Up: Primary care in 3 to 4 weeks please review AVS for further details       Treatment Follow-Up: Carlotta Tanner  .        \"Much or all of the text in this note was generated through the use of Dragon Dictate voice to text software. Errors in spelling or words which appear to be out of contact are unintentional, may be present due having escaped editing\"    "

## 2023-10-27 NOTE — PLAN OF CARE
Problem: Sleep Disturbance  Goal: Adequate Sleep/Rest  Outcome: Progressing   Goal Outcome Evaluation:  Patient is observed to sleep throughout the noc.

## 2023-10-27 NOTE — PLAN OF CARE
You were detoxed from benzodiazapines with the Modified Selective Severity Protocol using  phenobarbital.  You identified factors in your relapse and plan to followup with treatment. You have met with a  to develop a treatment plan for discharge.  You have had labs drawn and a copy of those labs will be sent home with you. Your alcohol withdrawal is complete and you are being discharged today.  Please bring your lab results with to your follow up doctor appointment.  Make your recovery a priority!